# Patient Record
Sex: MALE | Race: WHITE | Employment: FULL TIME | URBAN - METROPOLITAN AREA
[De-identification: names, ages, dates, MRNs, and addresses within clinical notes are randomized per-mention and may not be internally consistent; named-entity substitution may affect disease eponyms.]

---

## 2018-09-06 ENCOUNTER — HOSPITAL ENCOUNTER (EMERGENCY)
Facility: CLINIC | Age: 28
Discharge: HOME | End: 2018-09-06
Attending: PSYCHIATRY & NEUROLOGY | Admitting: PSYCHIATRY & NEUROLOGY
Payer: COMMERCIAL

## 2018-09-06 VITALS
WEIGHT: 221.2 LBS | RESPIRATION RATE: 18 BRPM | SYSTOLIC BLOOD PRESSURE: 135 MMHG | DIASTOLIC BLOOD PRESSURE: 82 MMHG | TEMPERATURE: 97.3 F | OXYGEN SATURATION: 100 % | HEART RATE: 76 BPM

## 2018-09-06 DIAGNOSIS — F43.23 ADJUSTMENT DISORDER WITH MIXED ANXIETY AND DEPRESSED MOOD: ICD-10-CM

## 2018-09-06 DIAGNOSIS — Z86.59 HISTORY OF POST TRAUMATIC STRESS DISORDER: ICD-10-CM

## 2018-09-06 PROCEDURE — 90791 PSYCH DIAGNOSTIC EVALUATION: CPT

## 2018-09-06 PROCEDURE — 99284 EMERGENCY DEPT VISIT MOD MDM: CPT | Mod: Z6 | Performed by: PSYCHIATRY & NEUROLOGY

## 2018-09-06 PROCEDURE — 99285 EMERGENCY DEPT VISIT HI MDM: CPT | Mod: 25 | Performed by: PSYCHIATRY & NEUROLOGY

## 2018-09-06 RX ORDER — ESCITALOPRAM OXALATE 10 MG/1
10 TABLET ORAL DAILY
Qty: 30 TABLET | Refills: 0 | Status: SHIPPED | OUTPATIENT
Start: 2018-09-06

## 2018-09-06 RX ORDER — HYDROXYZINE HYDROCHLORIDE 25 MG/1
25 TABLET, FILM COATED ORAL EVERY 8 HOURS PRN
Qty: 30 TABLET | Refills: 0 | Status: SHIPPED | OUTPATIENT
Start: 2018-09-06 | End: 2020-10-28

## 2018-09-06 ASSESSMENT — ENCOUNTER SYMPTOMS
HYPERACTIVE: 0
GASTROINTESTINAL NEGATIVE: 1
NEUROLOGICAL NEGATIVE: 1
EYES NEGATIVE: 1
HALLUCINATIONS: 0
DECREASED CONCENTRATION: 1
HEMATOLOGIC/LYMPHATIC NEGATIVE: 1
ENDOCRINE NEGATIVE: 1
CARDIOVASCULAR NEGATIVE: 1
MUSCULOSKELETAL NEGATIVE: 1
RESPIRATORY NEGATIVE: 1
ACTIVITY CHANGE: 1
NERVOUS/ANXIOUS: 1

## 2018-09-06 NOTE — DISCHARGE INSTRUCTIONS
Continue with therapy. DBT is encouraged  Start escitalopram to manage your mood concerns  Start as needed hydroxyzine for anxiety and agitation until escitalopram takes effect  Follow-up with your primary care provider for med refills and continued management

## 2018-09-06 NOTE — ED AVS SNAPSHOT
[facility], Emergency Department    [address]    [place] [address]    Phone:  [phone]    Fax:  [phone]                                       Arturo Raman   MRN: [id number]    Department:  [facility], Emergency Department   Date of Visit:  9/6/2018           Patient Information     Date Of Birth          1990        Your diagnoses for this visit were:     Adjustment disorder with mixed anxiety and depressed mood     History of post traumatic stress disorder        You were seen by Cade Donahue MD.      Follow-up Information     Follow up with No Ref-Primary, Physician.        Discharge Instructions       Continue with therapy. DBT is encouraged  Start escitalopram to manage your mood concerns  Start as needed hydroxyzine for anxiety and agitation until escitalopram takes effect  Follow-up with your primary care provider for med refills and continued management    24 Hour Appointment Hotline       To make an appointment at any [facility] clinic, call [phone] ([phone]). If you don't have a family doctor or clinic, we will help you find one. [facility] clinics are conveniently located to serve the needs of you and your family.             Review of your medicines      START taking        Dose / Directions Last dose taken    escitalopram 10 MG tablet   Commonly known as:  LEXAPRO   Dose:  10 mg   Quantity:  30 tablet        Take 1 tablet (10 mg) by mouth daily   Refills:  0        hydrOXYzine 25 MG tablet   Commonly known as:  ATARAX   Dose:  25 mg   Quantity:  30 tablet        Take 1 tablet (25 mg) by mouth every 8 hours as needed for anxiety   Refills:  0                Prescriptions were sent or printed at these locations (2 Prescriptions)                   Other Prescriptions                Printed at Department/Unit printer (2 of 2)         escitalopram (LEXAPRO) 10 MG tablet               hydrOXYzine (ATARAX) 25 MG tablet                Orders Needing Specimen  "Collection     None      Pending Results     No orders found from 2018 to 2018.            Pending Culture Results     No orders found from 2018 to 2018.            Pending Results Instructions     If you had any lab results that were not finalized at the time of your Discharge, you can call the ED Lab Result RN at [phone]. You will be contacted by this team for any positive Lab results or changes in treatment. The nurses are available 7 days a week from 10A to 6:30P.  You can leave a message 24 hours per day and they will return your call.        Thank you for choosing [facility]       Thank you for choosing [facility] for your care. Our goal is always to provide you with excellent care. Hearing back from our patients is one way we can continue to improve our services. Please take a few minutes to complete the written survey that you may receive in the mail after you visit with us. Thank you!        [employer] Information     [employer] lets you send messages to your doctor, view your test results, renew your prescriptions, schedule appointments and more. To sign up, go to [email or web address] . Click on \"Log in\" on the left side of the screen, which will take you to the Welcome page. Then click on \"Sign up Now\" on the right side of the page.     You will be asked to enter the access code listed below, as well as some personal information. Please follow the directions to create your username and password.     Your access code is: [email or web address]  Expires: 2018  4:20 PM     Your access code will  in 90 days. If you need help or a new code, please call your [facility] clinic or [phone].        Care EveryWhere ID     This is your Care EveryWhere ID. This could be used by other organizations to access your [facility] medical records  [id number]        Equal Access to Services     PHILIPP DONALD AH: Herb Avalos, leatha perez, claire olivia " jose regan ah. So [facility] [phone].    ATENCIÓN: Si habla español, tiene a vasquez disposición servicios gratuitos de asistencia lingüística. Llame al [phone].    We comply with applicable federal civil rights laws and [place] laws. We do not discriminate on the basis of race, color, national origin, age, disability, sex, sexual orientation, or gender identity.            After Visit Summary       This is your record. Keep this with you and show to your community pharmacist(s) and doctor(s) at your next visit.
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
## 2018-09-06 NOTE — ED AVS SNAPSHOT
[facility], Emergency Department    [address]    [facility] [address]    Phone:  [phone]    Fax:  [phone]                                       Arturo Raman   MRN: [id number]    Department:  [facility], Emergency Department   Date of Visit:  9/6/2018           After Visit Summary Signature Page     I have received my discharge instructions, and my questions have been answered. I have discussed any challenges I see with this plan with the nurse or doctor.    ..........................................................................................................................................  Patient/Patient Representative Signature      ..........................................................................................................................................  Patient Representative Print Name and Relationship to Patient    ..................................................               ................................................  Date                                            Time    ..........................................................................................................................................  Reviewed by Signature/Title    ...................................................              ..............................................  Date                                                            Time          22EPIC Rev 08/18

## 2020-07-01 ENCOUNTER — OFFICE VISIT (OUTPATIENT)
Dept: DERMATOLOGY | Facility: CLINIC | Age: 30
End: 2020-07-01
Payer: COMMERCIAL

## 2020-07-01 VITALS — DIASTOLIC BLOOD PRESSURE: 83 MMHG | HEART RATE: 64 BPM | SYSTOLIC BLOOD PRESSURE: 129 MMHG

## 2020-07-01 DIAGNOSIS — L63.9 ALOPECIA AREATA: Primary | ICD-10-CM

## 2020-07-01 PROCEDURE — 11900 INJECT SKIN LESIONS </W 7: CPT | Performed by: PHYSICIAN ASSISTANT

## 2020-07-01 PROCEDURE — 99203 OFFICE O/P NEW LOW 30 MIN: CPT | Mod: 25 | Performed by: PHYSICIAN ASSISTANT

## 2020-07-01 RX ORDER — CLOBETASOL PROPIONATE 0.5 MG/ML
SOLUTION TOPICAL 2 TIMES DAILY
Qty: 50 ML | Refills: 1 | Status: SHIPPED | OUTPATIENT
Start: 2020-07-01 | End: 2021-02-26

## 2020-07-01 RX ORDER — LORAZEPAM 0.5 MG/1
0.5 TABLET ORAL
COMMUNITY
End: 2023-02-09

## 2020-07-01 RX ORDER — DESONIDE 0.5 MG/G
CREAM TOPICAL PRN
COMMUNITY
End: 2021-06-16

## 2020-07-01 RX ORDER — DEXTROAMPHETAMINE SACCHARATE, AMPHETAMINE ASPARTATE, DEXTROAMPHETAMINE SULFATE AND AMPHETAMINE SULFATE 5; 5; 5; 5 MG/1; MG/1; MG/1; MG/1
TABLET ORAL
COMMUNITY
Start: 2020-03-12

## 2020-07-01 NOTE — PATIENT INSTRUCTIONS
Today we injected Kenalog. Kenalog is an antiinflammatory medication. We can do injections every four weeks as needed. Atrophy (thinning of the skin) and pigment changes can occur.    Proper skin care from [facility]:    -Eliminate harsh soaps as they strip the natural oils from the skin, often resulting in dry itchy skin ( i.e. Dial, Zest, Bulgarian Spring)  -Use mild soaps such as Cetaphil or Dove Sensitive Skin in the shower. You do not need to use soap on arms, legs, and trunk every time you shower unless visibly soiled.   -Avoid hot or cold showers.  -After showering, lightly dry off and apply moisturizing within 2-3 minutes. This will help trap moisture in the skin.   -Aggressive use of a moisturizer at least 1-2 times a day to the entire body (including -Vanicream, Cetaphil, Aquaphor or Cerave) and moisturize hands after every washing.  -We recommend using moisturizers that come in a tub that needs to be scooped out, not a pump. This has more of an oil base. It will hold moisture in your skin much better than a water base moisturizer. The above recommended are non-pore clogging.      Wear a sunscreen with at least SPF 30 on your face, ears, neck and V of the chest daily. Wear sunscreen on other areas of the body if those areas are exposed to the sun throughout the day. Sunscreens can contain physical and/or chemical blockers. Physical blockers are less likely to clog pores, these include zinc oxide and titanium dioxide. Reapply every two hour and after swimming. Sunscreen examples include Neutrogena, CeraVe, Blue Lizard, Elta MD and many others.    UV radiation  UVA radiation remains constant throughout the day and throughout the year. It is a longer wavelength than UVB and therefore penetrates deeper into the skin leading to immediate and delayed tanning, photoaging, and skin cancer. 70-80% of UVA and UVB radiation occurs between the hours of 10am-2pm.  UVB radiation  UVB radiation causes the most  harmful effects and is more significant during the summer months. However, snow and ice can reflect UVB radiation leading to skin damage during the winter months as well. UVB radiation is responsible for tanning, burning, inflammation, delayed erythema (pinkness), pigmentation (brown spots), and skin cancer.       Apply a thin layer of clobetasol to affected area 2x a day for 4 weeks to scalp. Tapering with improvement. Do not use on face or body folds.  Apply a thin layer of desonide to affected area on beard 2x a day for 4 weeks to beard area.   Discussed side effects of topical steroids including but not limited to atrophy (skin thinning), striae (stretch marks) telangiectasias, steroid acne, and others. Do not apply to normal skin. Do not apply to discolored skin that does not have rash present. Educated patient on post inflammatory hyperpigmentation.
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
## 2020-07-16 ENCOUNTER — TRANSFERRED RECORDS (OUTPATIENT)
Dept: HEALTH INFORMATION MANAGEMENT | Facility: CLINIC | Age: 30
End: 2020-07-16
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
## 2020-09-24 ENCOUNTER — OFFICE VISIT (OUTPATIENT)
Dept: NEUROSURGERY | Facility: CLINIC | Age: 30
End: 2020-09-24
Attending: PSYCHIATRY & NEUROLOGY
Payer: COMMERCIAL

## 2020-09-24 VITALS
DIASTOLIC BLOOD PRESSURE: 73 MMHG | OXYGEN SATURATION: 97 % | WEIGHT: 225 LBS | HEART RATE: 78 BPM | TEMPERATURE: 98.4 F | SYSTOLIC BLOOD PRESSURE: 118 MMHG | BODY MASS INDEX: 28.88 KG/M2 | HEIGHT: 74 IN

## 2020-09-24 DIAGNOSIS — G44.229 CHRONIC TENSION-TYPE HEADACHE, NOT INTRACTABLE: ICD-10-CM

## 2020-09-24 DIAGNOSIS — S39.012A LUMBAR STRAIN, INITIAL ENCOUNTER: ICD-10-CM

## 2020-09-24 DIAGNOSIS — S16.1XXA CERVICAL STRAIN, INITIAL ENCOUNTER: Primary | ICD-10-CM

## 2020-09-24 PROBLEM — G44.209 TENSION TYPE HEADACHE: Status: ACTIVE | Noted: 2020-09-24

## 2020-09-24 PROCEDURE — G0463 HOSPITAL OUTPT CLINIC VISIT: HCPCS

## 2020-09-24 PROCEDURE — 99204 OFFICE O/P NEW MOD 45 MIN: CPT | Performed by: PSYCHIATRY & NEUROLOGY

## 2020-09-24 ASSESSMENT — PAIN SCALES - GENERAL: PAINLEVEL: MILD PAIN (3)

## 2020-09-24 ASSESSMENT — MIFFLIN-ST. JEOR: SCORE: 2050.34
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
## 2020-10-28 ENCOUNTER — OFFICE VISIT (OUTPATIENT)
Dept: DERMATOLOGY | Facility: CLINIC | Age: 30
End: 2020-10-28
Payer: COMMERCIAL

## 2020-10-28 VITALS — SYSTOLIC BLOOD PRESSURE: 125 MMHG | DIASTOLIC BLOOD PRESSURE: 75 MMHG

## 2020-10-28 DIAGNOSIS — L63.9 ALOPECIA AREATA: Primary | ICD-10-CM

## 2020-10-28 DIAGNOSIS — L64.9 ANDROGENETIC ALOPECIA: ICD-10-CM

## 2020-10-28 PROCEDURE — 99213 OFFICE O/P EST LOW 20 MIN: CPT | Mod: 25 | Performed by: PHYSICIAN ASSISTANT

## 2020-10-28 PROCEDURE — 11900 INJECT SKIN LESIONS </W 7: CPT | Performed by: PHYSICIAN ASSISTANT

## 2020-10-28 RX ORDER — PROPRANOLOL HYDROCHLORIDE 10 MG/1
TABLET ORAL
COMMUNITY
Start: 2020-10-22 | End: 2023-02-09

## 2020-10-28 NOTE — PROGRESS NOTES
HPI:  Arturo Raman is a 29 year old male patient here today for alopecia areata of scalp and beard .  Patient states this has been present for months.  Patient reports the following symptoms: improvement with desonide probably once a day to beard and and clobetasol bid 5 days a week to scalp. LOV IL TAC with improvement.  Patient reports the following previous treatments: topicals and IL TAC.  Patient reports the following modifying factors: none.  Associated symptoms: none.  Patient has no other skin complaints today.  Remainder of the HPI, Meds, PMH, Allergies, FH, and SH was reviewed in chart.      History reviewed. No pertinent past medical history.    History reviewed. No pertinent surgical history.     Family History   Problem Relation Age of Onset     Hypertension Father        Social History     Socioeconomic History     Marital status: Single     Spouse name: Not on file     Number of children: Not on file     Years of education: Not on file     Highest education level: Not on file   Occupational History     Not on file   Social Needs     Financial resource strain: Not on file     Food insecurity     Worry: Not on file     Inability: Not on file     Transportation needs     Medical: Not on file     Non-medical: Not on file   Tobacco Use     Smoking status: Former Smoker     Quit date:      Years since quittin.8     Smokeless tobacco: Never Used   Substance and Sexual Activity     Alcohol use: No     Drug use: No     Sexual activity: Not on file   Lifestyle     Physical activity     Days per week: Not on file     Minutes per session: Not on file     Stress: Not on file   Relationships     Social connections     Talks on phone: Not on file     Gets together: Not on file     Attends Restorationism service: Not on file     Active member of club or organization: Not on file     Attends meetings of clubs or organizations: Not on file     Relationship status: Not on file     Intimate partner violence      Fear of current or ex partner: Not on file     Emotionally abused: Not on file     Physically abused: Not on file     Forced sexual activity: Not on file   Other Topics Concern     Parent/sibling w/ CABG, MI or angioplasty before 65F 55M? Not Asked   Social History Narrative     Not on file       Outpatient Encounter Medications as of 10/28/2020   Medication Sig Dispense Refill     amphetamine-dextroamphetamine (ADDERALL) 20 MG tablet TAKE HALF TABLET (10 MG) TWICE DAILY.       clobetasol (TEMOVATE) 0.05 % external solution Apply topically 2 times daily To aa on scalp x 1 month. Do not use on face or body folds. 50 mL 1     desonide (DESOWEN) 0.05 % external cream Apply topically as needed       escitalopram (LEXAPRO) 10 MG tablet Take 1 tablet (10 mg) by mouth daily 30 tablet 0     LORazepam (ATIVAN) 0.5 MG tablet Take 0.5 mg by mouth       propranolol (INDERAL) 10 MG tablet TK 1 T PO BID       [DISCONTINUED] hydrOXYzine (ATARAX) 25 MG tablet Take 1 tablet (25 mg) by mouth every 8 hours as needed for anxiety 30 tablet 0     No facility-administered encounter medications on file as of 10/28/2020.        Review Of Systems:  Skin: loss of hair  Eyes: negative  Ears/Nose/Throat: negative  Respiratory: No shortness of breath, dyspnea on exertion, cough, or hemoptysis  Cardiovascular: negative  Gastrointestinal: negative  Genitourinary: negative  Musculoskeletal: negative  Neurologic: negative  Psychiatric: negative  Hematologic/Lymphatic/Immunologic: negative  Endocrine: negative      Objective:     /75   Eyes: Conjunctivae/lids: Normal   ENT: Lips:  Normal  MSK: Normal  Cardiovascular: Peripheral edema none  Pulm: Breathing Normal  Neuro/Psych: Orientation: A/O x 3 Normal; Mood/Affect: Normal, NAD, WDWN  Pt accompanied by: self  Following areas examined: face, neck, scalp  Cabello skin type:ii   Findings:  Annular patch of non scarring hair loss with depigmented terminal hairs on left parietal scalp,  submental, left cheek( beard area). Thinning of hair at right frontotemporal region  Assessment and Plan:  1) alopecia areata +/- male pattern  Disc etiology and course  Treatment options include topical steroid, IL Tac, laser, and squaric acid  Apply a thin layer of clobetasol to affected area 2x a day for 4 weeks to scalp. Tapering with improvement. Do not use on face or body folds.  Apply a thin layer of desonide to affected area on beard 2x a day for 4 weeks to beard area.   Discussed side effects of topical steroids including but not limited to atrophy (skin thinning), striae (stretch marks) telangiectasias, steroid acne, and others. Do not apply to normal skin. Do not apply to discolored skin that does not have rash present. Educated patient on post inflammatory hyperpigmentation  IL TAC: PGACAC discussed.  Risks including but not limited to injection site reaction, bruising, no resolution.  All questions answered and entertained to patient s satisfaction.  Informed consent obtained.  IL TAC in concentration of 5mg/ml was injected ID to two patches on hairloss.  Total injected was  0.5 ml.  Patient tolerated without complications and given wound care instructions, including not to move product around.  Return in 4 weeks for follow-up and possible additional IL TAC. May need additional treatment. May not be effective.    Lot #:[id number]  Exp: feb 2022  Sodium chloride  Lot #:[id number]  Exp: [id number]    Follow up in 1-2 months

## 2020-10-28 NOTE — PATIENT INSTRUCTIONS
Proper skin care from [facility]:    -Eliminate harsh soaps as they strip the natural oils from the skin, often resulting in dry itchy skin ( i.e. Dial, Zest, [place])  -Use mild soaps such as Cetaphil or Dove Sensitive Skin in the shower. You do not need to use soap on arms, legs, and trunk every time you shower unless visibly soiled.   -Avoid hot or cold showers.  -After showering, lightly dry off and apply moisturizing within 2-3 minutes. This will help trap moisture in the skin.   -Aggressive use of a moisturizer at least 1-2 times a day to the entire body (including -Vanicream, Cetaphil, Aquaphor or Cerave) and moisturize hands after every washing.  -We recommend using moisturizers that come in a tub that needs to be scooped out, not a pump. This has more of an oil base. It will hold moisture in your skin much better than a water base moisturizer. The above recommended are non-pore clogging.      Wear a sunscreen with at least SPF 30 on your face, ears, neck and V of the chest daily. Wear sunscreen on other areas of the body if those areas are exposed to the sun throughout the day. Sunscreens can contain physical and/or chemical blockers. Physical blockers are less likely to clog pores, these include zinc oxide and titanium dioxide. Reapply every two hour and after swimming. Sunscreen examples include Neutrogena, CeraVe, Blue Lizard, Elta MD and many others.    UV radiation  UVA radiation remains constant throughout the day and throughout the year. It is a longer wavelength than UVB and therefore penetrates deeper into the skin leading to immediate and delayed tanning, photoaging, and skin cancer. 70-80% of UVA and UVB radiation occurs between the hours of 10am-2pm.  UVB radiation  UVB radiation causes the most harmful effects and is more significant during the summer months. However, snow and ice can reflect UVB radiation leading to skin damage during the winter months as well. UVB radiation is  responsible for tanning, burning, inflammation, delayed erythema (pinkness), pigmentation (brown spots), and skin cancer.     I recommend self monthly full body exams and yearly full body exams with a dermatology provider. If you develop a new or changing lesion please follow up for examination. Most skin cancers are pink and scaly or pink and pearly. However, we do see blue/brown/black skin cancers.  Consider the ABCDEs of melanoma when giving yourself your monthly full body exam ( don't forget the groin, buttocks, feet, toes, etc). A-asymmetry, B-borders, C-color, D-diameter, E-elevation or evolving. If you see any of these changes please follow up in clinic. If you cannot see your back I recommend purchasing a hand held mirror to use with a larger wall mirror.

## 2020-10-28 NOTE — LETTER
10/28/2020         RE: Arturo Raman  [address]  [facility] [address]        Dear Colleague,    Thank you for referring your patient, Arturo Raman, to the [facility]. Please see a copy of my visit note below.    HPI:  Arturo Raman is a 29 year old male patient here today for alopecia areata of scalp and beard .  Patient states this has been present for months.  Patient reports the following symptoms: improvement with desonide probably once a day to beard and and clobetasol bid 5 days a week to scalp. LOV IL TAC with improvement.  Patient reports the following previous treatments: topicals and IL TAC.  Patient reports the following modifying factors: none.  Associated symptoms: none.  Patient has no other skin complaints today.  Remainder of the HPI, Meds, PMH, Allergies, FH, and SH was reviewed in chart.      History reviewed. No pertinent past medical history.    History reviewed. No pertinent surgical history.     Family History   Problem Relation Age of Onset     Hypertension Father        Social History     Socioeconomic History     Marital status: Single     Spouse name: Not on file     Number of children: Not on file     Years of education: Not on file     Highest education level: Not on file   Occupational History     Not on file   Social Needs     Financial resource strain: Not on file     Food insecurity     Worry: Not on file     Inability: Not on file     Transportation needs     Medical: Not on file     Non-medical: Not on file   Tobacco Use     Smoking status: Former Smoker     Quit date:      Years since quittin.8     Smokeless tobacco: Never Used   Substance and Sexual Activity     Alcohol use: No     Drug use: No     Sexual activity: Not on file   Lifestyle     Physical activity     Days per week: Not on file     Minutes per session: Not on file     Stress: Not on file   Relationships     Social connections     Talks on phone: Not  on file     Gets together: Not on file     Attends Hinduism service: Not on file     Active member of club or organization: Not on file     Attends meetings of clubs or organizations: Not on file     Relationship status: Not on file     Intimate partner violence     Fear of current or ex partner: Not on file     Emotionally abused: Not on file     Physically abused: Not on file     Forced sexual activity: Not on file   Other Topics Concern     Parent/sibling w/ CABG, MI or angioplasty before 65F 55M? Not Asked   Social History Narrative     Not on file       Outpatient Encounter Medications as of 10/28/2020   Medication Sig Dispense Refill     amphetamine-dextroamphetamine (ADDERALL) 20 MG tablet TAKE HALF TABLET (10 MG) TWICE DAILY.       clobetasol (TEMOVATE) 0.05 % external solution Apply topically 2 times daily To aa on scalp x 1 month. Do not use on face or body folds. 50 mL 1     desonide (DESOWEN) 0.05 % external cream Apply topically as needed       escitalopram (LEXAPRO) 10 MG tablet Take 1 tablet (10 mg) by mouth daily 30 tablet 0     LORazepam (ATIVAN) 0.5 MG tablet Take 0.5 mg by mouth       propranolol (INDERAL) 10 MG tablet TK 1 T PO BID       [DISCONTINUED] hydrOXYzine (ATARAX) 25 MG tablet Take 1 tablet (25 mg) by mouth every 8 hours as needed for anxiety 30 tablet 0     No facility-administered encounter medications on file as of 10/28/2020.        Review Of Systems:  Skin: loss of hair  Eyes: negative  Ears/Nose/Throat: negative  Respiratory: No shortness of breath, dyspnea on exertion, cough, or hemoptysis  Cardiovascular: negative  Gastrointestinal: negative  Genitourinary: negative  Musculoskeletal: negative  Neurologic: negative  Psychiatric: negative  Hematologic/Lymphatic/Immunologic: negative  Endocrine: negative      Objective:     /75   Eyes: Conjunctivae/lids: Normal   ENT: Lips:  Normal  MSK: Normal  Cardiovascular: Peripheral edema none  Pulm: Breathing Normal  Neuro/Psych:  Orientation: A/O x 3 Normal; Mood/Affect: Normal, NAD, WDWN  Pt accompanied by: self  Following areas examined: face, neck, scalp  Cabello skin type:ii   Findings:  Annular patch of non scarring hair loss with depigmented terminal hairs on left parietal scalp, submental, left cheek( beard area). Thinning of hair at right frontotemporal region  Assessment and Plan:  1) alopecia areata +/- male pattern  Disc etiology and course  Treatment options include topical steroid, IL Tac, laser, and squaric acid  Apply a thin layer of clobetasol to affected area 2x a day for 4 weeks to scalp. Tapering with improvement. Do not use on face or body folds.  Apply a thin layer of desonide to affected area on beard 2x a day for 4 weeks to beard area.   Discussed side effects of topical steroids including but not limited to atrophy (skin thinning), striae (stretch marks) telangiectasias, steroid acne, and others. Do not apply to normal skin. Do not apply to discolored skin that does not have rash present. Educated patient on post inflammatory hyperpigmentation  IL TAC: PGACAC discussed.  Risks including but not limited to injection site reaction, bruising, no resolution.  All questions answered and entertained to patient s satisfaction.  Informed consent obtained.  IL TAC in concentration of 5mg/ml was injected ID to two patches on hairloss.  Total injected was  0.5 ml.  Patient tolerated without complications and given wound care instructions, including not to move product around.  Return in 4 weeks for follow-up and possible additional IL TAC. May need additional treatment. May not be effective.    Lot #:[id number]  Exp: feb 2022  Sodium chloride  Lot #:[id number]  Exp: [id number]    Follow up in 1-2 months         Again, thank you for allowing me to participate in the care of your patient.        Sincerely,        Krystle Vazquez PA-C

## 2021-02-25 DIAGNOSIS — L63.9 ALOPECIA AREATA: ICD-10-CM

## 2021-02-26 RX ORDER — CLOBETASOL PROPIONATE 0.5 MG/ML
SOLUTION TOPICAL 2 TIMES DAILY
Qty: 50 ML | Refills: 0 | Status: SHIPPED | OUTPATIENT
Start: 2021-02-26 | End: 2021-06-16

## 2021-05-14 DIAGNOSIS — L63.9 ALOPECIA AREATA: ICD-10-CM

## 2021-05-14 NOTE — LETTER
[facility] [address] [address]  Phone: [phone]  Fax: [phone]    May 17, 2021      Arturo Raman                                                                                                               [address] [address]            Dear Mr. Westfalllyubov,    Many medications require routine follow-up with your Doctor.      At this time we ask that: You schedule a routine office visit with Krystle Vazquez PA-C in Dermatology.    Your prescription for clobetasol: Cannot be refilled until the above noted follow up has been completed.      Thank you,      [facility]

## 2021-05-17 RX ORDER — CLOBETASOL PROPIONATE 0.5 MG/ML
SOLUTION TOPICAL 2 TIMES DAILY
Qty: 50 ML | Refills: 0 | OUTPATIENT
Start: 2021-05-17

## 2021-05-17 NOTE — TELEPHONE ENCOUNTER
Snail mail letter sent:    Dear Mr. Raman,    Many medications require routine follow-up with your Doctor.      At this time we ask that: You schedule a routine office visit with Krystle Vazquez PA-C in Dermatology.    Your prescription for clobetasol: Cannot be refilled until the above noted follow up has been completed.      Thank you,      [facility]

## 2021-05-19 ENCOUNTER — TELEPHONE (OUTPATIENT)
Dept: FAMILY MEDICINE | Facility: CLINIC | Age: 31
End: 2021-05-19

## 2021-05-19 NOTE — TELEPHONE ENCOUNTER
Snail mail letter sent:    Dear  Lamine,    Many medications require routine follow-up with your Doctor.      At this time we ask that: You schedule a routine office visit with Krystle Vazquez PA-C in Dermatology.    Your prescription: Cannot be refilled until the above noted follow up has been completed.      Thank you,      [facility]

## 2021-05-19 NOTE — LETTER
[facility]  [address] [place] [address]  Phone: [phone]  Fax: [phone]    May 19, 2021      Arturo Raman                                                                                                           [address] [address]            Dear  Lamine,    Many medications require routine follow-up with your Doctor.      At this time we ask that: You schedule a routine office visit with Krystle Vazquez PA-C in Dermatology.    Your prescription: Cannot be refilled until the above noted follow up has been completed.      Thank you,      [facility]

## 2021-05-19 NOTE — TELEPHONE ENCOUNTER
Reason for Call:  Other call back    Detailed comments: needs pharmacy called for prescription renewaL. [facility]    Phone Number Patient can be reached at: Cell number on file:    Telephone Information:   Mobile [phone]       Best Time: ANY    Can we leave a detailed message on this number? YES    Call taken on 5/19/2021 at 10:32 AM by Marla Leslie

## 2021-06-16 ENCOUNTER — OFFICE VISIT (OUTPATIENT)
Dept: DERMATOLOGY | Facility: CLINIC | Age: 31
End: 2021-06-16
Payer: COMMERCIAL

## 2021-06-16 VITALS — HEART RATE: 60 BPM | DIASTOLIC BLOOD PRESSURE: 85 MMHG | SYSTOLIC BLOOD PRESSURE: 131 MMHG

## 2021-06-16 DIAGNOSIS — L64.9 ANDROGENETIC ALOPECIA: Primary | ICD-10-CM

## 2021-06-16 DIAGNOSIS — L63.9 ALOPECIA AREATA: ICD-10-CM

## 2021-06-16 PROCEDURE — 99213 OFFICE O/P EST LOW 20 MIN: CPT | Mod: 25 | Performed by: PHYSICIAN ASSISTANT

## 2021-06-16 PROCEDURE — 11900 INJECT SKIN LESIONS </W 7: CPT | Performed by: PHYSICIAN ASSISTANT

## 2021-06-16 RX ORDER — DESONIDE 0.5 MG/G
CREAM TOPICAL
Qty: 60 G | Refills: 1 | Status: SHIPPED | OUTPATIENT
Start: 2021-06-16 | End: 2021-06-21

## 2021-06-16 RX ORDER — CLOBETASOL PROPIONATE 0.5 MG/ML
SOLUTION TOPICAL 2 TIMES DAILY
Qty: 50 ML | Refills: 2 | Status: SHIPPED | OUTPATIENT
Start: 2021-06-16 | End: 2023-02-09

## 2021-06-16 NOTE — PATIENT INSTRUCTIONS
Today we injected Kenalog. Kenalog is an antiinflammatory medication. We can do injections every four weeks as needed. Atrophy (thinning of the skin) and pigment changes can occur.      Proper skin care from [facility]:    -Eliminate harsh soaps as they strip the natural oils from the skin, often resulting in dry itchy skin ( i.e. Dial, Zest, Nigerian Spring)  -Use mild soaps such as Cetaphil or Dove Sensitive Skin in the shower. You do not need to use soap on arms, legs, and trunk every time you shower unless visibly soiled.   -Avoid hot or cold showers.  -After showering, lightly dry off and apply moisturizing within 2-3 minutes. This will help trap moisture in the skin.   -Aggressive use of a moisturizer at least 1-2 times a day to the entire body (including -Vanicream, Cetaphil, Aquaphor or Cerave) and moisturize hands after every washing.  -We recommend using moisturizers that come in a tub that needs to be scooped out, not a pump. This has more of an oil base. It will hold moisture in your skin much better than a water base moisturizer. The above recommended are non-pore clogging.      Wear a sunscreen with at least SPF 30 on your face, ears, neck and V of the chest daily. Wear sunscreen on other areas of the body if those areas are exposed to the sun throughout the day. Sunscreens can contain physical and/or chemical blockers. Physical blockers are less likely to clog pores, these include zinc oxide and titanium dioxide. Reapply every two hour and after swimming. Sunscreen examples include Neutrogena, CeraVe, Blue Lizard, Elta MD and many others.    UV radiation  UVA radiation remains constant throughout the day and throughout the year. It is a longer wavelength than UVB and therefore penetrates deeper into the skin leading to immediate and delayed tanning, photoaging, and skin cancer. 70-80% of UVA and UVB radiation occurs between the hours of 10am-2pm.  UVB radiation  UVB radiation causes the most  harmful effects and is more significant during the summer months. However, snow and ice can reflect UVB radiation leading to skin damage during the winter months as well. UVB radiation is responsible for tanning, burning, inflammation, delayed erythema (pinkness), pigmentation (brown spots), and skin cancer.     I recommend self monthly full body exams and yearly full body exams with a dermatology provider. If you develop a new or changing lesion please follow up for examination. Most skin cancers are pink and scaly or pink and pearly. However, we do see blue/brown/black skin cancers.  Consider the ABCDEs of melanoma when giving yourself your monthly full body exam ( don't forget the groin, buttocks, feet, toes, etc). A-asymmetry, B-borders, C-color, D-diameter, E-elevation or evolving. If you see any of these changes please follow up in clinic. If you cannot see your back I recommend purchasing a hand held mirror to use with a larger wall mirror.

## 2021-06-16 NOTE — PROGRESS NOTES
HPI:  Arturo Raman is a 29 year old male patient here today for alopecia areata of scalp and beard .  Patient states this has been present for months.  Patient reports the following symptoms: improvement with desonide probably once a day to beard and and clobetasol  to scalp. LOV IL TAC with improvement.  Patient reports the following previous treatments: topicals and IL TAC.  Patient reports the following modifying factors: none.  Associated symptoms: none.  Patient has no other skin complaints today.  Remainder of the HPI, Meds, PMH, Allergies, FH, and SH was reviewed in chart.      No past medical history on file.    No past surgical history on file.     Family History   Problem Relation Age of Onset     Hypertension Father        Social History     Socioeconomic History     Marital status: Single     Spouse name: Not on file     Number of children: Not on file     Years of education: Not on file     Highest education level: Not on file   Occupational History     Not on file   Social Needs     Financial resource strain: Not on file     Food insecurity     Worry: Not on file     Inability: Not on file     Transportation needs     Medical: Not on file     Non-medical: Not on file   Tobacco Use     Smoking status: Former Smoker     Quit date:      Years since quittin.4     Smokeless tobacco: Never Used   Substance and Sexual Activity     Alcohol use: No     Drug use: No     Sexual activity: Not on file   Lifestyle     Physical activity     Days per week: Not on file     Minutes per session: Not on file     Stress: Not on file   Relationships     Social connections     Talks on phone: Not on file     Gets together: Not on file     Attends Moravian service: Not on file     Active member of club or organization: Not on file     Attends meetings of clubs or organizations: Not on file     Relationship status: Not on file     Intimate partner violence     Fear of current or ex partner: Not on file      Emotionally abused: Not on file     Physically abused: Not on file     Forced sexual activity: Not on file   Other Topics Concern     Parent/sibling w/ CABG, MI or angioplasty before 65F 55M? Not Asked   Social History Narrative     Not on file       Outpatient Encounter Medications as of 6/16/2021   Medication Sig Dispense Refill     amphetamine-dextroamphetamine (ADDERALL) 20 MG tablet TAKE HALF TABLET (10 MG) TWICE DAILY.       clobetasol (TEMOVATE) 0.05 % external solution Apply topically 2 times daily To aa on scalp x 1 month. Do not use on face or body folds. 50 mL 0     desonide (DESOWEN) 0.05 % external cream Apply topically as needed       escitalopram (LEXAPRO) 10 MG tablet Take 1 tablet (10 mg) by mouth daily 30 tablet 0     LORazepam (ATIVAN) 0.5 MG tablet Take 0.5 mg by mouth       propranolol (INDERAL) 10 MG tablet TK 1 T PO BID       No facility-administered encounter medications on file as of 6/16/2021.        Review Of Systems:  Skin: loss of hair  Eyes: negative  Ears/Nose/Throat: negative  Respiratory: No shortness of breath, dyspnea on exertion, cough, or hemoptysis  Cardiovascular: negative  Gastrointestinal: negative  Genitourinary: negative  Musculoskeletal: negative  Neurologic: negative  Psychiatric: negative  Hematologic/Lymphatic/Immunologic: negative  Endocrine: negative      Objective:     There were no vitals taken for this visit.  Eyes: Conjunctivae/lids: Normal   ENT: Lips:  Normal  MSK: Normal  Cardiovascular: Peripheral edema none  Pulm: Breathing Normal  Neuro/Psych: Orientation: A/O x 3 Normal; Mood/Affect: Normal, NAD, WDWN  Pt accompanied by: self  Following areas examined: face, neck, scalp  Cabelol skin type:ii   Findings:  Annular patch of non scarring hair loss with depigmented terminal hairs on left parietal scalp, submental, jaw line. Thinning of hair at right frontotemporal region  Assessment and Plan:  1) alopecia areata and androgenetic alopecia  Disc etiology and  course  Treatment options include topical steroid and IL Tac  Apply a thin layer of clobetasol to affected area 2x a day for 4 weeks to scalp. Tapering with improvement. Do not use on face or body folds.  Apply a thin layer of desonide to affected area on beard 2x a day for 4 weeks to beard area.   Discussed side effects of topical steroids including but not limited to atrophy (skin thinning), striae (stretch marks) telangiectasias, steroid acne, and others. Do not apply to normal skin. Do not apply to discolored skin that does not have rash present. Educated patient on post inflammatory hyperpigmentation  IL TAC: PGACAC discussed.  Risks including but not limited to injection site reaction, bruising, no resolution.  All questions answered and entertained to patient s satisfaction.  Informed consent obtained.  IL TAC in concentration of 5mg/ml was injected ID to one patch on left parietal scalp and 2.5mg/ml into 5 patches on beard area.  Total injected was  0.5 ml.  Patient tolerated without complications and given wound care instructions, including not to move product around.  Return in 4 weeks for follow-up and possible additional IL TAC. May need additional treatment. May not be effective.    IL TACLot abu [id number] Aug 2022  Sodium chloride lot [id number]    Male pattern alopecia or androgenetic alopecia is mediated by dihydrotestosterone causing miniaturization of the hair follicles. Hair loss if first seen on the anterior scalp and bitemporal regions. The only clinically proven methods to prevent hair loss are Finasteride and Minoxidil. Neither Finasteride nor Minoxidil can cause hair to regrow. Once treatment is discontinued hair loss will progress.    Side effects of oral Finasteride include and are not limited to impotence, decreased libido, abnormal ejaculation, sexual dysfunction and gynecomastia.    Side effects of Minoxidil (Rogaine) include contact dermatitis, pruritis (itch), and skin  irritation.        Follow up in for il tac. Topical sent

## 2021-06-17 ENCOUNTER — TELEPHONE (OUTPATIENT)
Dept: FAMILY MEDICINE | Facility: CLINIC | Age: 31
End: 2021-06-17

## 2021-06-17 NOTE — TELEPHONE ENCOUNTER
Prior Authorization Retail Medication Request    Medication/Dose: Desonide  ICD code (if different than what is on RX):    Previously Tried and Failed:    Rationale:      Insurance Name:  [facility]  Insurance ID:  [id number]      Pharmacy Information (if different than what is on RX)  Name:  [facility]  Phone:  [phone]    Yannick Rendon, PharmD  [facility]  [phone]

## 2021-06-18 NOTE — TELEPHONE ENCOUNTER
Central Prior Authorization Team   Phone: [phone]    PA Initiation    Medication: Desonide  Insurance Company:    Pharmacy Filling the Rx: [place], [place] - [address] [place] [address]  Filling Pharmacy Phone: [phone]  Filling Pharmacy Fax: [phone]  Start Date: 6/18/2021

## 2021-06-21 DIAGNOSIS — L63.9 ALOPECIA AREATA: Primary | ICD-10-CM

## 2021-06-21 RX ORDER — TRIAMCINOLONE ACETONIDE 0.25 MG/G
CREAM TOPICAL 2 TIMES DAILY
Qty: 30 G | Refills: 1 | Status: SHIPPED | OUTPATIENT
Start: 2021-06-21 | End: 2023-02-09

## 2021-06-21 NOTE — TELEPHONE ENCOUNTER
PRIOR AUTHORIZATION DENIED    Medication: Desonide-DENIED    Denial Date: 6/21/2021    Denial Rational: PATIENT MUST TRY/FAIL ALL FORMULARY ALTERNATIVES - BETAMETHASONE LOTION, FLUOCINOLONE CREAM, TRIAMCINOLONE LOTION, CREAM.        Appeal Information:  IF PATIENT IS UNABLE TO TRY/FAIL ALTERNATIVE(S) PLEASE SUPPLY PA TEAM WITH A LETTER OF MEDICAL NECESSITY WITH CLINICAL REASON.

## 2023-02-03 ASSESSMENT — ENCOUNTER SYMPTOMS
DYSURIA: 0
WEAKNESS: 0
FREQUENCY: 0
MYALGIAS: 1
NAUSEA: 0
JOINT SWELLING: 0
PARESTHESIAS: 0
ABDOMINAL PAIN: 1
CONSTIPATION: 0
FEVER: 0
HEARTBURN: 0
HEMATURIA: 0
COUGH: 0
HEADACHES: 0
DIZZINESS: 0
CHILLS: 0
SORE THROAT: 0
EYE PAIN: 0
PALPITATIONS: 0
DIARRHEA: 0
SHORTNESS OF BREATH: 0
NERVOUS/ANXIOUS: 0
ARTHRALGIAS: 0
HEMATOCHEZIA: 0

## 2023-02-09 ENCOUNTER — OFFICE VISIT (OUTPATIENT)
Dept: FAMILY MEDICINE | Facility: CLINIC | Age: 33
End: 2023-02-09
Payer: COMMERCIAL

## 2023-02-09 VITALS
OXYGEN SATURATION: 97 % | HEIGHT: 74 IN | SYSTOLIC BLOOD PRESSURE: 133 MMHG | DIASTOLIC BLOOD PRESSURE: 79 MMHG | RESPIRATION RATE: 14 BRPM | BODY MASS INDEX: 30.34 KG/M2 | TEMPERATURE: 97.3 F | HEART RATE: 71 BPM | WEIGHT: 236.4 LBS

## 2023-02-09 DIAGNOSIS — Z00.00 ROUTINE GENERAL MEDICAL EXAMINATION AT A HEALTH CARE FACILITY: Primary | ICD-10-CM

## 2023-02-09 DIAGNOSIS — Z13.1 SCREENING FOR DIABETES MELLITUS: ICD-10-CM

## 2023-02-09 DIAGNOSIS — K62.89 RECTAL PAIN: ICD-10-CM

## 2023-02-09 DIAGNOSIS — R03.0 ELEVATED BP WITHOUT DIAGNOSIS OF HYPERTENSION: ICD-10-CM

## 2023-02-09 DIAGNOSIS — F90.0 ATTENTION DEFICIT HYPERACTIVITY DISORDER (ADHD), PREDOMINANTLY INATTENTIVE TYPE: ICD-10-CM

## 2023-02-09 DIAGNOSIS — Z11.3 SCREEN FOR STD (SEXUALLY TRANSMITTED DISEASE): ICD-10-CM

## 2023-02-09 DIAGNOSIS — E66.811 CLASS 1 OBESITY DUE TO EXCESS CALORIES WITHOUT SERIOUS COMORBIDITY WITH BODY MASS INDEX (BMI) OF 30.0 TO 30.9 IN ADULT: ICD-10-CM

## 2023-02-09 DIAGNOSIS — F41.9 ANXIETY: ICD-10-CM

## 2023-02-09 DIAGNOSIS — E66.09 CLASS 1 OBESITY DUE TO EXCESS CALORIES WITHOUT SERIOUS COMORBIDITY WITH BODY MASS INDEX (BMI) OF 30.0 TO 30.9 IN ADULT: ICD-10-CM

## 2023-02-09 DIAGNOSIS — Z13.220 SCREENING FOR LIPID DISORDERS: ICD-10-CM

## 2023-02-09 LAB
ANION GAP SERPL CALCULATED.3IONS-SCNC: 10 MMOL/L (ref 7–15)
BUN SERPL-MCNC: 9 MG/DL (ref 6–20)
CALCIUM SERPL-MCNC: 9.6 MG/DL (ref 8.6–10)
CHLORIDE SERPL-SCNC: 104 MMOL/L (ref 98–107)
CHOLEST SERPL-MCNC: 138 MG/DL
CREAT SERPL-MCNC: 0.71 MG/DL (ref 0.67–1.17)
DEPRECATED HCO3 PLAS-SCNC: 28 MMOL/L (ref 22–29)
GFR SERPL CREATININE-BSD FRML MDRD: >90 ML/MIN/1.73M2
GLUCOSE SERPL-MCNC: 82 MG/DL (ref 70–99)
HBA1C MFR BLD: 5.3 % (ref 0–5.6)
HCV AB SERPL QL IA: NONREACTIVE
HDLC SERPL-MCNC: 59 MG/DL
HIV 1+2 AB+HIV1 P24 AG SERPL QL IA: NONREACTIVE
LDLC SERPL CALC-MCNC: 64 MG/DL
NONHDLC SERPL-MCNC: 79 MG/DL
POTASSIUM SERPL-SCNC: 4.1 MMOL/L (ref 3.4–5.3)
SODIUM SERPL-SCNC: 142 MMOL/L (ref 136–145)
T PALLIDUM AB SER QL: NONREACTIVE
TRIGL SERPL-MCNC: 76 MG/DL

## 2023-02-09 PROCEDURE — 80061 LIPID PANEL: CPT | Performed by: FAMILY MEDICINE

## 2023-02-09 PROCEDURE — 87389 HIV-1 AG W/HIV-1&-2 AB AG IA: CPT | Performed by: FAMILY MEDICINE

## 2023-02-09 PROCEDURE — 86780 TREPONEMA PALLIDUM: CPT | Performed by: FAMILY MEDICINE

## 2023-02-09 PROCEDURE — 36415 COLL VENOUS BLD VENIPUNCTURE: CPT | Performed by: FAMILY MEDICINE

## 2023-02-09 PROCEDURE — 87591 N.GONORRHOEAE DNA AMP PROB: CPT | Performed by: FAMILY MEDICINE

## 2023-02-09 PROCEDURE — 99385 PREV VISIT NEW AGE 18-39: CPT | Performed by: FAMILY MEDICINE

## 2023-02-09 PROCEDURE — 87491 CHLMYD TRACH DNA AMP PROBE: CPT | Performed by: FAMILY MEDICINE

## 2023-02-09 PROCEDURE — 86803 HEPATITIS C AB TEST: CPT | Performed by: FAMILY MEDICINE

## 2023-02-09 PROCEDURE — 80048 BASIC METABOLIC PNL TOTAL CA: CPT | Performed by: FAMILY MEDICINE

## 2023-02-09 PROCEDURE — 83036 HEMOGLOBIN GLYCOSYLATED A1C: CPT | Performed by: FAMILY MEDICINE

## 2023-02-09 ASSESSMENT — ENCOUNTER SYMPTOMS
SORE THROAT: 0
DIARRHEA: 0
PALPITATIONS: 0
EYE PAIN: 0
COUGH: 0
WEAKNESS: 0
ABDOMINAL PAIN: 1
CHILLS: 0
JOINT SWELLING: 0
PARESTHESIAS: 0
NAUSEA: 0
HEMATOCHEZIA: 0
DYSURIA: 0
CONSTIPATION: 0
MYALGIAS: 1
SHORTNESS OF BREATH: 0
NERVOUS/ANXIOUS: 0
HEMATURIA: 0
HEARTBURN: 0
ARTHRALGIAS: 0
FREQUENCY: 0
HEADACHES: 0
FEVER: 0
DIZZINESS: 0

## 2023-02-09 ASSESSMENT — PAIN SCALES - GENERAL: PAINLEVEL: NO PAIN (0)

## 2023-02-09 NOTE — PROGRESS NOTES
SUBJECTIVE:   CC: Arturo is an 32 year old who presents for preventative health visit.     Patient has been advised of split billing requirements and indicates understanding: Yes  Healthy Habits:     Getting at least 3 servings of Calcium per day:  Yes    Bi-annual eye exam:  NO    Dental care twice a year:  Yes    Sleep apnea or symptoms of sleep apnea:  None    Diet:  Regular (no restrictions)    Frequency of exercise:  2-3 days/week    Duration of exercise:  15-30 minutes    Taking medications regularly:  Yes    Medication side effects:  Not applicable    PHQ-2 Total Score: 0    Additional concerns today:  Yes    Rectal pain-momentraily- started at work- jan 19th 2023.  No rectal bleeding, no constipation.  dul lmomentary pain.    Known history of anxiety and attention deficit hyperactivity disorder- no new concerns  Medications under care of psychiatrist.    Wants to complete the screening for sexually transmitted infection       Today's PHQ-2 Score:   PHQ-2 ( 1999 [employer]) 2/3/2023   Q1: Little interest or pleasure in doing things 0   Q2: Feeling down, depressed or hopeless 0   PHQ-2 Score 0   Q1: Little interest or pleasure in doing things Not at all   Q2: Feeling down, depressed or hopeless Not at all   PHQ-2 Score 0       Have you ever done Advance Care Planning? (For example, a Health Directive, POLST, or a discussion with a medical provider or your loved ones about your wishes): No, advance care planning information given to patient to review.  Advanced care planning was discussed at today's visit.    Social History     Tobacco Use     Smoking status: Former     Types: Cigarettes     Quit date: 1/1/2013     Years since quitting: 10.1     Smokeless tobacco: Never   Substance Use Topics     Alcohol use: No     If you drink alcohol do you typically have >3 drinks per day or >7 drinks per week? No    No flowsheet data found.    Last PSA: No results found for: PSA    Reviewed orders with patient. Reviewed health  "maintenance and updated orders accordingly - Yes  BP Readings from Last 3 Encounters:   02/09/23 133/79   06/16/21 131/85   10/28/20 125/75    Wt Readings from Last 3 Encounters:   02/09/23 107.2 kg (236 lb 6.4 oz)   09/24/20 102.1 kg (225 lb)   09/06/18 100.3 kg (221 lb 3.2 oz)                    Reviewed and updated as needed this visit by clinical staff   Tobacco  Allergies  Meds  Problems  Med Hx  Surg Hx  Fam Hx          Reviewed and updated as needed this visit by Provider   Tobacco  Allergies  Meds  Problems  Med Hx  Surg Hx  Fam Hx             Review of Systems   Constitutional: Negative for chills and fever.   HENT: Negative for congestion, ear pain, hearing loss and sore throat.    Eyes: Negative for pain and visual disturbance.   Respiratory: Negative for cough and shortness of breath.    Cardiovascular: Negative for chest pain, palpitations and peripheral edema.   Gastrointestinal: Positive for abdominal pain. Negative for constipation, diarrhea, heartburn, hematochezia and nausea.   Genitourinary: Negative for dysuria, frequency, genital sores, hematuria, impotence, penile discharge and urgency.   Musculoskeletal: Positive for myalgias. Negative for arthralgias and joint swelling.   Skin: Negative for rash.   Neurological: Negative for dizziness, weakness, headaches and paresthesias.   Psychiatric/Behavioral: Negative for mood changes. The patient is not nervous/anxious.      OBJECTIVE:   /79   Pulse 71   Temp 97.3  F (36.3  C) (Temporal)   Resp 14   Ht 1.88 m (6' 2\")   Wt 107.2 kg (236 lb 6.4 oz)   SpO2 97%   BMI 30.35 kg/m      Physical Exam  GENERAL: healthy, alert and no distress  EYES: Eyes grossly normal to inspection, PERRL and conjunctivae and sclerae normal  HENT: ear canals and TM's normal, nose and mouth without ulcers or lesions  NECK: no adenopathy, no asymmetry, masses, or scars and thyroid normal to palpation  RESP: lungs clear to auscultation - no rales, " rhonchi or wheezes  CV: regular rate and rhythm, normal S1 S2, no S3 or S4, no murmur, click or rub, no peripheral edema and peripheral pulses strong  ABDOMEN: soft, nontender, no hepatosplenomegaly, no masses and bowel sounds normal  MS: no gross musculoskeletal defects noted, no edema  SKIN: no suspicious lesions or rashes  NEURO: Normal strength and tone, mentation intact and speech normal  PSYCH: mentation appears normal, affect normal/bright    Diagnostic Test Results:  Labs reviewed in Epic  No results found for this or any previous visit (from the past 24 hour(s)).    ASSESSMENT/PLAN:   Arturo was seen today for physical and rectal problem.    Diagnoses and all orders for this visit:    Routine general medical examination at a health care facility  -     REVIEW OF HEALTH MAINTENANCE PROTOCOL ORDERS  Vaccinations discussed- patient deferred COVID vaccine today     Attention deficit hyperactivity disorder (ADHD), predominantly inattentive type  Comments:  Under care of [facility]- [facility].Adderall 20 mg BID    Anxiety  Comments:  Under care of [facility]- [facility].Adderall 20 mg BID    Rectal pain  Comments:  jan 19- main, and since then dull ache random  Advised to make separate appointment to discuss it in detail.  No rectal bleeding or constipation    Elevated BP without diagnosis of hypertension  Comments:  recheck periodically  Orders:  -     Basic metabolic panel  (Ca, Cl, CO2, Creat, Gluc, K, Na, BUN); Future  Please see patient instructions     Screen for STD (sexually transmitted disease)  -     HIV Antigen Antibody Combo; Future  -     Hepatitis C Screen Reflex to HCV RNA Quant and Genotype; Future  -     Neisseria gonorrhoeae PCR - Clinic Collect; Future  -     Chlamydia trachomatis PCR; Future  -     Treponema Abs w Reflex to RPR and Titer; Future    Screening for diabetes mellitus  -     Hemoglobin A1c; Future  -     Basic metabolic panel  (Ca, Cl, CO2, Creat, Gluc, K, Na, BUN);  Future    Obesity  encouraged life style changes    Screening for lipid disorders  Comments:  non fasting  Orders:  -     Lipid Profile (Chol, Trig, HDL, LDL calc); Future        Patient has been advised of split billing requirements and indicates understanding: Yes      COUNSELING:   Reviewed preventive health counseling, as reflected in patient instructions       Regular exercise       Healthy diet/nutrition       Vision screening       Hearing screening       Safe sex practices/STD prevention        He reports that he quit smoking about 10 years ago. His smoking use included cigarettes. He has never used smokeless tobacco.        Irma Delacruz MD  [facility]

## 2023-02-09 NOTE — PATIENT INSTRUCTIONS
-Normal  BP is  119/79 or lower. Upper number is systolic Blood pressure (SBP). Lower number is diastolic  Blood pressure (DBP)  -Blood pressure between 120-139/80-89 (prehypertensive blood pressure/ class 1 hypertension )   -Hypertensive is  BP of 140/90 or above and needs treatment with medication.  -life style changes that are beneficial to reach goal of normal Blood pressure   1.Weight loss of 1 kg can reduce systolic Blood pressure  (SBP) by 1 mmHg  2.Health healthy diet (eg DASH dietary pattern can reduce SBP by 11 mmHg)  3.Structured excercise program (150 mins aerobic activity/week can reduce SBP by 5 mmHg)  4.Low salt  diet - very important.(eg: cut by 255 or 1000 mg/day to reduce SBP by 5mmHg)  5. Increase 4-5 serving of fresh vegetables & fruits /day- good source of potassium.      if More than 140/90  after a month of above life style changes  Return visit with MD/provider  for recheck & consideration of medications .       Preventive Health Recommendations  Male Ages 26 - 39    Yearly exam:             See your health care provider every year in order to  o   Review health changes.   o   Discuss preventive care.    o   Review your medicines if your doctor has prescribed any.  You should be tested each year for STDs (sexually transmitted diseases), if you re at risk.   After age 35, talk to your provider about cholesterol testing. If you are at risk for heart disease, have your cholesterol tested at least every 5 years.   If you are at risk for diabetes, you should have a diabetes test (fasting glucose).  Shots: Get a flu shot each year. Get a tetanus shot every 10 years.     Nutrition:  Eat at least 5 servings of fruits and vegetables daily.   Eat whole-grain bread, whole-wheat pasta and brown rice instead of white grains and rice.   Get adequate Calcium and Vitamin D.     Lifestyle  Exercise for at least 150 minutes a week (30 minutes a day, 5 days a week). This will help you control your weight and  prevent disease.   Limit alcohol to one drink per day.   No smoking.   Wear sunscreen to prevent skin cancer.   See your dentist every six months for an exam and cleaning.

## 2023-02-10 LAB
C TRACH DNA SPEC QL NAA+PROBE: NEGATIVE
N GONORRHOEA DNA SPEC QL NAA+PROBE: NEGATIVE

## 2023-03-22 ENCOUNTER — OFFICE VISIT (OUTPATIENT)
Dept: FAMILY MEDICINE | Facility: CLINIC | Age: 33
End: 2023-03-22
Payer: COMMERCIAL

## 2023-03-22 VITALS
HEIGHT: 74 IN | RESPIRATION RATE: 14 BRPM | BODY MASS INDEX: 31.89 KG/M2 | HEART RATE: 67 BPM | TEMPERATURE: 98.7 F | WEIGHT: 248.5 LBS | DIASTOLIC BLOOD PRESSURE: 80 MMHG | SYSTOLIC BLOOD PRESSURE: 128 MMHG | OXYGEN SATURATION: 98 %

## 2023-03-22 DIAGNOSIS — K62.89 RECTAL PAIN: ICD-10-CM

## 2023-03-22 DIAGNOSIS — K59.4 PROCTALGIA FUGAX: Primary | ICD-10-CM

## 2023-03-22 PROCEDURE — 99213 OFFICE O/P EST LOW 20 MIN: CPT | Performed by: FAMILY MEDICINE

## 2023-03-22 ASSESSMENT — PAIN SCALES - GENERAL: PAINLEVEL: NO PAIN (0)

## 2023-03-22 NOTE — LETTER
FOR: Work         RE: Arturo Raman  : 1990    03/22/23              Arturo  is seen today 23 at our clinic.   No work restriction needed.  However please allow appropriate time off for scope- should the pain recurs.        Sincerely,      Irma Delacruz MD

## 2023-03-22 NOTE — PROGRESS NOTES
"  Assessment & Plan     Rectal pain- most likely due to Proctalgia fugax  Plan Initial pain started at work-1/19/2023  Pathophysiology discussed.Most likely benign its benign.  Treatment  is for the symptoms as needed. Sometimes therapy help.  If pain recurs and is of moderate intensity-  Scope is recommended.    - Adult GI  Referral - Procedure Only; Future    No work restriction needed and follow up as abve for scope nd of follow up with me as needed          Irma Delacruz MD  [facility]    Janine Morris is a 32 year old, presenting for the following health issues:  Rectal Problem (Related to WC injury)  No flowsheet data found.  History of Present Illness       Reason for visit:  Rectal Pain  Symptom onset:  More than a month  Symptoms include:  Pains that come and go, from a work injury with starting a .  Symptom intensity:  Mild  Symptom progression:  Staying the same  Had these symptoms before:  No    He eats 2-3 servings of fruits and vegetables daily.He consumes 3 sweetened beverage(s) daily.   He is taking medications regularly.     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  Since then has had a few similar and sometimes mild episode a few more time.   There was a sharp pain in the moment, but since then has had \"phantom pains\" in the area randomly. He denies any noticeable pattern to when the pains occur.         Review of Systems   Constitutional, HEENT, cardiovascular, pulmonary, GI, , musculoskeletal, neuro, skin, endocrine and psych systems are negative, except as otherwise noted.      Objective    /80   Pulse 67   Temp 98.7  F (37.1  C) (Temporal)   Resp 14   Ht 1.88 m (6' 2\")   Wt 112.7 kg (248 lb 8 oz)   SpO2 98%   BMI 31.91 kg/m    Body mass index is 31.91 kg/m .  Physical Exam   GENERAL: healthy, alert " and no distress  RECTAL (male): normal sphincter tone, no rectal masses, prostate normal size, smooth, nontender without nodules or masses

## 2023-05-07 ENCOUNTER — HEALTH MAINTENANCE LETTER (OUTPATIENT)
Age: 33
End: 2023-05-07

## 2024-01-10 ENCOUNTER — PATIENT OUTREACH (OUTPATIENT)
Dept: CARE COORDINATION | Facility: CLINIC | Age: 34
End: 2024-01-10
Payer: COMMERCIAL

## 2024-01-24 ENCOUNTER — PATIENT OUTREACH (OUTPATIENT)
Dept: CARE COORDINATION | Facility: CLINIC | Age: 34
End: 2024-01-24
Payer: COMMERCIAL

## 2024-04-26 SDOH — HEALTH STABILITY: PHYSICAL HEALTH: ON AVERAGE, HOW MANY MINUTES DO YOU ENGAGE IN EXERCISE AT THIS LEVEL?: 120 MIN

## 2024-04-26 SDOH — HEALTH STABILITY: PHYSICAL HEALTH: ON AVERAGE, HOW MANY DAYS PER WEEK DO YOU ENGAGE IN MODERATE TO STRENUOUS EXERCISE (LIKE A BRISK WALK)?: 6 DAYS

## 2024-04-26 ASSESSMENT — SOCIAL DETERMINANTS OF HEALTH (SDOH): HOW OFTEN DO YOU GET TOGETHER WITH FRIENDS OR RELATIVES?: ONCE A WEEK

## 2024-05-01 ENCOUNTER — OFFICE VISIT (OUTPATIENT)
Dept: FAMILY MEDICINE | Facility: CLINIC | Age: 34
End: 2024-05-01
Payer: COMMERCIAL

## 2024-05-01 VITALS
DIASTOLIC BLOOD PRESSURE: 80 MMHG | HEART RATE: 75 BPM | WEIGHT: 263 LBS | TEMPERATURE: 97.4 F | BODY MASS INDEX: 33.75 KG/M2 | OXYGEN SATURATION: 98 % | RESPIRATION RATE: 19 BRPM | HEIGHT: 74 IN | SYSTOLIC BLOOD PRESSURE: 130 MMHG

## 2024-05-01 DIAGNOSIS — F90.0 ATTENTION DEFICIT HYPERACTIVITY DISORDER (ADHD), PREDOMINANTLY INATTENTIVE TYPE: ICD-10-CM

## 2024-05-01 DIAGNOSIS — M77.12 LEFT TENNIS ELBOW: ICD-10-CM

## 2024-05-01 DIAGNOSIS — E66.811 CLASS 1 OBESITY DUE TO EXCESS CALORIES WITHOUT SERIOUS COMORBIDITY WITH BODY MASS INDEX (BMI) OF 30.0 TO 30.9 IN ADULT: ICD-10-CM

## 2024-05-01 DIAGNOSIS — G56.02 CARPAL TUNNEL SYNDROME OF LEFT WRIST: ICD-10-CM

## 2024-05-01 DIAGNOSIS — Z00.00 ROUTINE GENERAL MEDICAL EXAMINATION AT A HEALTH CARE FACILITY: Primary | ICD-10-CM

## 2024-05-01 DIAGNOSIS — F41.9 ANXIETY: ICD-10-CM

## 2024-05-01 DIAGNOSIS — L63.9 ALOPECIA AREATA: ICD-10-CM

## 2024-05-01 DIAGNOSIS — E66.09 CLASS 1 OBESITY DUE TO EXCESS CALORIES WITHOUT SERIOUS COMORBIDITY WITH BODY MASS INDEX (BMI) OF 30.0 TO 30.9 IN ADULT: ICD-10-CM

## 2024-05-01 DIAGNOSIS — Z11.3 SCREEN FOR STD (SEXUALLY TRANSMITTED DISEASE): ICD-10-CM

## 2024-05-01 DIAGNOSIS — F60.3 BORDERLINE PERSONALITY DISORDER (H): ICD-10-CM

## 2024-05-01 DIAGNOSIS — R03.0 ELEVATED BP WITHOUT DIAGNOSIS OF HYPERTENSION: ICD-10-CM

## 2024-05-01 LAB
C TRACH DNA SPEC QL NAA+PROBE: NEGATIVE
HBA1C MFR BLD: 5.3 % (ref 0–5.6)
HIV 1+2 AB+HIV1 P24 AG SERPL QL IA: NONREACTIVE
N GONORRHOEA DNA SPEC QL NAA+PROBE: NEGATIVE
T PALLIDUM AB SER QL: NONREACTIVE

## 2024-05-01 PROCEDURE — 86780 TREPONEMA PALLIDUM: CPT | Performed by: FAMILY MEDICINE

## 2024-05-01 PROCEDURE — 87491 CHLMYD TRACH DNA AMP PROBE: CPT | Performed by: FAMILY MEDICINE

## 2024-05-01 PROCEDURE — 36415 COLL VENOUS BLD VENIPUNCTURE: CPT | Performed by: FAMILY MEDICINE

## 2024-05-01 PROCEDURE — 87591 N.GONORRHOEAE DNA AMP PROB: CPT | Performed by: FAMILY MEDICINE

## 2024-05-01 PROCEDURE — 99395 PREV VISIT EST AGE 18-39: CPT | Performed by: FAMILY MEDICINE

## 2024-05-01 PROCEDURE — 87389 HIV-1 AG W/HIV-1&-2 AB AG IA: CPT | Performed by: FAMILY MEDICINE

## 2024-05-01 PROCEDURE — 84443 ASSAY THYROID STIM HORMONE: CPT | Performed by: FAMILY MEDICINE

## 2024-05-01 PROCEDURE — 80048 BASIC METABOLIC PNL TOTAL CA: CPT | Performed by: FAMILY MEDICINE

## 2024-05-01 PROCEDURE — 87522 HEPATITIS C REVRS TRNSCRPJ: CPT | Performed by: FAMILY MEDICINE

## 2024-05-01 PROCEDURE — 83036 HEMOGLOBIN GLYCOSYLATED A1C: CPT | Performed by: FAMILY MEDICINE

## 2024-05-01 ASSESSMENT — PAIN SCALES - GENERAL: PAINLEVEL: NO PAIN (0)

## 2024-05-01 NOTE — PATIENT INSTRUCTIONS
Preventive Care Advice   This is general advice given by our system to help you stay healthy. However, your care team may have specific advice just for you. Please talk to your care team about your preventive care needs.  Nutrition  Eat 5 or more servings of fruits and vegetables each day.  Try wheat bread, brown rice and whole grain pasta (instead of white bread, rice, and pasta).  Get enough calcium and vitamin D. Check the label on foods and aim for 100% of the RDA (recommended daily allowance).  Lifestyle  Exercise at least 150 minutes each week   (30 minutes a day, 5 days a week).  Do muscle strengthening activities 2 days a week. These help control your weight and prevent disease.  No smoking.  Wear sunscreen to prevent skin cancer.  Have a dental exam and cleaning every 6 months.  Yearly exams  See your health care team every year to talk about:  Any changes in your health.  Any medicines your care team has prescribed.  Preventive care, family planning, and ways to prevent chronic diseases.  Shots (vaccines)   HPV shots (up to age 26), if you've never had them before.  Hepatitis B shots (up to age 59), if you've never had them before.  COVID-19 shot: Get this shot when it's due.  Flu shot: Get a flu shot every year.  Tetanus shot: Get a tetanus shot every 10 years.  Pneumococcal, hepatitis A, and RSV shots: Ask your care team if you need these based on your risk.  Shingles shot (for age 50 and up).  General health tests  Diabetes screening:  Starting at age 35, Get screened for diabetes at least every 3 years.  If you are younger than age 35, ask your care team if you should be screened for diabetes.  Cholesterol test: At age 39, start having a cholesterol test every 5 years, or more often if advised.  Bone density scan (DEXA): At age 50, ask your care team if you should have this scan for osteoporosis (brittle bones).  Hepatitis C: Get tested at least once in your life.  STIs (sexually transmitted  infections)  Before age 24: Ask your care team if you should be screened for STIs.  After age 24: Get screened for STIs if you're at risk. You are at risk for STIs (including HIV) if:  You are sexually active with more than one person.  You don't use condoms every time.  You or a partner was diagnosed with a sexually transmitted infection.  If you are at risk for HIV, ask about PrEP medicine to prevent HIV.  Get tested for HIV at least once in your life, whether you are at risk for HIV or not.  Cancer screening tests  Cervical cancer screening: If you have a cervix, begin getting regular cervical cancer screening tests at age 21. Most people who have regular screenings with normal results can stop after age 65. Talk about this with your provider.  Breast cancer scan (mammogram): If you've ever had breasts, begin having regular mammograms starting at age 40. This is a scan to check for breast cancer.  Colon cancer screening: It is important to start screening for colon cancer at age 45.  Have a colonoscopy test every 10 years (or more often if you're at risk) Or, ask your provider about stool tests like a FIT test every year or Cologuard test every 3 years.  To learn more about your testing options, visit: [email or web address]/904698.pdf.  For help making a decision, visit: https://bit.ly/mi53331.  Prostate cancer screening test: If you have a prostate and are age 55 to 69, ask your provider if you would benefit from a yearly prostate cancer screening test.  Lung cancer screening: If you are a current or former smoker age 50 to 80, ask your care team if ongoing lung cancer screenings are right for you.  For informational purposes only. Not to replace the advice of your health care provider. Copyright   2023 [employer]. All rights reserved. Clinically reviewed by the [facility] Transitions Program. [employer] 826962 - REV 01/24.    Safer Sex: Care Instructions  Overview  Safer sex is a way to  reduce your risk of getting a sexually transmitted infection (STI). It can also help prevent pregnancy.  Several products can help you practice safer sex and reduce your chance of STIs. One of the best is a condom. There are internal and external condoms. You can use a special rubber sheet (dental dam) for protection during oral sex. Disposable gloves can keep your hands from touching blood, semen, or other body fluids that can carry infections.  Remember that birth control methods such as diaphragms, IUDs, foams, and birth control pills do not stop you from getting STIs.  Follow-up care is a key part of your treatment and safety. Be sure to make and go to all appointments, and call your doctor if you are having problems. It's also a good idea to know your test results and keep a list of the medicines you take.  How can you care for yourself at home?  Think about getting vaccinated to help prevent hepatitis A, hepatitis B, and human papillomavirus (HPV). They can be spread through sex.  Use a condom every time you have sex. Use an external condom, which goes on the penis. Or use an internal condom, which goes into the vagina or anus.  Make sure you use the right size external condom. A condom that's too small can break easily. A condom that's too big can slip off during sex.  Use a new condom each time you have sex. Be careful not to poke a hole in the condom when you open the wrapper.  Don't use an internal condom and an external condom at the same time.  Never use petroleum jelly (such as Vaseline), grease, hand lotion, baby oil, or anything with oil in it. These products can make holes in the condom.  After intercourse, hold the edge of the condom as you remove it. This will help keep semen from spilling out of the condom.  Do not have sex with anyone who has symptoms of an STI, such as sores on the genitals or mouth.  Do not drink a lot of alcohol or use drugs before sex.  Limit your sex partners. Sex with one  "partner who has sex only with you can reduce your risk of getting an STI.  Don't share sex toys. But if you do share them, use a condom and clean the sex toys between each use.  Talk to any partners before you have sex. Talk about what you feel comfortable with and whether you have any boundaries with sex. And find out if your partner or partners may be at risk for any STI. Keep in mind that a person may be able to spread an STI even if they do not have symptoms. You and any partners may want to get tested for STIs.  Where can you learn more?  Go to https://www.[employer].com.net/patiented  Enter B608 in the search box to learn more about \"Safer Sex: Care Instructions.\"  Current as of: November 27, 2023               Content Version: 14.0    6219-0261 [employer].   Care instructions adapted under license by your healthcare professional. If you have questions about a medical condition or this instruction, always ask your healthcare professional. [employer] disclaims any warranty or liability for your use of this information.      Preventive Care Advice   This is general advice given by our system to help you stay healthy. However, your care team may have specific advice just for you. Please talk to your care team about your preventive care needs.  Nutrition  Eat 5 or more servings of fruits and vegetables each day.  Try wheat bread, brown rice and whole grain pasta (instead of white bread, rice, and pasta).  Get enough calcium and vitamin D. Check the label on foods and aim for 100% of the RDA (recommended daily allowance).  Lifestyle  Exercise at least 150 minutes each week   (30 minutes a day, 5 days a week).  Do muscle strengthening activities 2 days a week. These help control your weight and prevent disease.  No smoking.  Wear sunscreen to prevent skin cancer.  Have a dental exam and cleaning every 6 months.  Yearly exams  See your health care team every year to talk about:  Any changes in " your health.  Any medicines your care team has prescribed.  Preventive care, family planning, and ways to prevent chronic diseases.  Shots (vaccines)   HPV shots (up to age 26), if you've never had them before.  Hepatitis B shots (up to age 59), if you've never had them before.  COVID-19 shot: Get this shot when it's due.  Flu shot: Get a flu shot every year.  Tetanus shot: Get a tetanus shot every 10 years.  Pneumococcal, hepatitis A, and RSV shots: Ask your care team if you need these based on your risk.  Shingles shot (for age 50 and up).  General health tests  Diabetes screening:  Starting at age 35, Get screened for diabetes at least every 3 years.  If you are younger than age 35, ask your care team if you should be screened for diabetes.  Cholesterol test: At age 39, start having a cholesterol test every 5 years, or more often if advised.  Bone density scan (DEXA): At age 50, ask your care team if you should have this scan for osteoporosis (brittle bones).  Hepatitis C: Get tested at least once in your life.  STIs (sexually transmitted infections)  Before age 24: Ask your care team if you should be screened for STIs.  After age 24: Get screened for STIs if you're at risk. You are at risk for STIs (including HIV) if:  You are sexually active with more than one person.  You don't use condoms every time.  You or a partner was diagnosed with a sexually transmitted infection.  If you are at risk for HIV, ask about PrEP medicine to prevent HIV.  Get tested for HIV at least once in your life, whether you are at risk for HIV or not.  Cancer screening tests  Cervical cancer screening: If you have a cervix, begin getting regular cervical cancer screening tests at age 21. Most people who have regular screenings with normal results can stop after age 65. Talk about this with your provider.  Breast cancer scan (mammogram): If you've ever had breasts, begin having regular mammograms starting at age 40. This is a scan to  check for breast cancer.  Colon cancer screening: It is important to start screening for colon cancer at age 45.  Have a colonoscopy test every 10 years (or more often if you're at risk) Or, ask your provider about stool tests like a FIT test every year or Cologuard test every 3 years.  To learn more about your testing options, visit: [email or web address].pdf.  For help making a decision, visit: https://bit.ly/lr35587.  Prostate cancer screening test: If you have a prostate and are age 55 to 69, ask your provider if you would benefit from a yearly prostate cancer screening test.  Lung cancer screening: If you are a current or former smoker age 50 to 80, ask your care team if ongoing lung cancer screenings are right for you.  For informational purposes only. Not to replace the advice of your health care provider. Copyright   2023 [facility]. All rights reserved. Clinically reviewed by the [facility] Transitions Program. [employer] 975952 - REV 01/24.    Safer Sex: Care Instructions  Overview  Safer sex is a way to reduce your risk of getting a sexually transmitted infection (STI). It can also help prevent pregnancy.  Several products can help you practice safer sex and reduce your chance of STIs. One of the best is a condom. There are internal and external condoms. You can use a special rubber sheet (dental dam) for protection during oral sex. Disposable gloves can keep your hands from touching blood, semen, or other body fluids that can carry infections.  Remember that birth control methods such as diaphragms, IUDs, foams, and birth control pills do not stop you from getting STIs.  Follow-up care is a key part of your treatment and safety. Be sure to make and go to all appointments, and call your doctor if you are having problems. It's also a good idea to know your test results and keep a list of the medicines you take.  How can you care for yourself at home?  Think about getting  "vaccinated to help prevent hepatitis A, hepatitis B, and human papillomavirus (HPV). They can be spread through sex.  Use a condom every time you have sex. Use an external condom, which goes on the penis. Or use an internal condom, which goes into the vagina or anus.  Make sure you use the right size external condom. A condom that's too small can break easily. A condom that's too big can slip off during sex.  Use a new condom each time you have sex. Be careful not to poke a hole in the condom when you open the wrapper.  Don't use an internal condom and an external condom at the same time.  Never use petroleum jelly (such as Vaseline), grease, hand lotion, baby oil, or anything with oil in it. These products can make holes in the condom.  After intercourse, hold the edge of the condom as you remove it. This will help keep semen from spilling out of the condom.  Do not have sex with anyone who has symptoms of an STI, such as sores on the genitals or mouth.  Do not drink a lot of alcohol or use drugs before sex.  Limit your sex partners. Sex with one partner who has sex only with you can reduce your risk of getting an STI.  Don't share sex toys. But if you do share them, use a condom and clean the sex toys between each use.  Talk to any partners before you have sex. Talk about what you feel comfortable with and whether you have any boundaries with sex. And find out if your partner or partners may be at risk for any STI. Keep in mind that a person may be able to spread an STI even if they do not have symptoms. You and any partners may want to get tested for STIs.  Where can you learn more?  Go to https://www.[employer].net/patiented  Enter B608 in the search box to learn more about \"Safer Sex: Care Instructions.\"  Current as of: November 27, 2023               Content Version: 14.0    5505-6702 [employer].   Care instructions adapted under license by your healthcare professional. If you have questions " about a medical condition or this instruction, always ask your healthcare professional. [employer] disclaims any warranty or liability for your use of this information.

## 2024-05-01 NOTE — PROGRESS NOTES
"Preventive Care Visit  [facility]  Irma Delacruz MD, Family Medicine  May 1, 2024      Assessment & Plan     1. Routine general medical examination at a health care facility      2. Attention deficit hyperactivity disorder (ADHD), predominantly inattentive type  3. Anxiety/ Borderline personality disorder (H)  Plan: - considered this problem when making today's plans  - no interventions today       -followed by specialist.     4. Elevated BP without diagnosis of hypertension  Plan: Please see patient instructions   Low salt diet, excercise   - Basic metabolic panel  (Ca, Cl, CO2, Creat, Gluc, K, Na, BUN); Future  - Hemoglobin A1c; Future    5. Class 1 obesity due to excess calories without serious comorbidity with body mass index (BMI) of 30.0 to 30.9 in adult  Plan: we discussed life style changes  Making food plan    6. Screen for STD (sexually transmitted disease)  Plan:  - Chlamydia trachomatis PCR; Future  - HIV Antigen Antibody Combo Cascade; Future  - Neisseria gonorrhoeae PCR; Future  - Treponema Abs w Reflex to RPR and Titer; Future  - Hepatitis C RNA, Quantitative by PCR with Confirmatory Reflex to Genotyping; Future    7. Alopecia areata  Plan: has seen derm- hair loss patches on face & was advised to check thyroid   - TSH with free T4 reflex; Future        Patient has been advised of split billing requirements and indicates understanding: Yes  Ordering of each unique test        BMI  Estimated body mass index is 33.77 kg/m  as calculated from the following:    Height as of this encounter: 1.88 m (6' 2\").    Weight as of this encounter: 119.3 kg (263 lb).   Weight management plan: Discussed healthy diet and exercise guidelines    Counseling  Appropriate preventive services were discussed with this patient, including applicable screening as appropriate for fall prevention, nutrition, physical activity, Tobacco-use cessation, weight loss and cognition.  Checklist reviewing " preventive services available has been given to the patient.  Reviewed patient's diet, addressing concerns and/or questions.       Work on weight loss  Regular exercise    Janine Morris is a 33 year old, presenting for the following:  Physical (Carpal tunnel/Tennis elbow /Left side )        5/1/2024     7:08 AM   Additional Questions   Roomed by jami byrd   Accompanied by clemente         5/1/2024     7:08 AM   Patient Reported Additional Medications   Patient reports taking the following new medications n/a        Health Care Directive  Patient does not have a Health Care Directive or Living Will: Discussed advance care planning with patient; information given to patient to review.    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  Known history of anxiety and attention deficit hyperactivity disorder   Has therapist and MH provier for medication management   Wants to get sexually transmitted infection screening completed as well    Left dominant hand, wakes up in am- burning pain rom upper forarm         4/26/2024   General Health   How would you rate your overall physical health? Good   Feel stress (tense, anxious, or unable to sleep) Not at all         4/26/2024   Nutrition   Three or more servings of calcium each day? (!) I DON'T KNOW   Diet: Regular (no restrictions)   How many servings of fruit and vegetables per day? (!) 0-1   How many sweetened beverages each day? 0-1         4/26/2024   Exercise   Days per week of moderate/strenous exercise 6 days   Average minutes spent exercising at this level 120 min         4/26/2024   Social Factors   Frequency of gathering with friends or relatives Once a week   Worry food won't last until get money to buy more No   Food not last or not have enough money for food? No   Do you have housing?  Yes   Are you worried about losing your housing? No   Lack of transportation? No   Unable  "to get utilities (heat,electricity)? No         2024   Dental   Dentist two times every year? Yes         2024   TB Screening   Were you born outside of the [place]? No         Today's PHQ-2 Score:       2024     7:02 AM   PHQ-2 (  [employer])   Q1: Little interest or pleasure in doing things 0   Q2: Feeling down, depressed or hopeless 0   PHQ-2 Score 0   Q1: Little interest or pleasure in doing things Not at all   Q2: Feeling down, depressed or hopeless Not at all   PHQ-2 Score 0           2024   Substance Use   Alcohol more than 3/day or more than 7/wk No   Do you use any other substances recreationally? No     Social History     Tobacco Use    Smoking status: Former     Current packs/day: 0.00     Types: Cigarettes     Quit date: 2013     Years since quittin.3    Smokeless tobacco: Never   Vaping Use    Vaping status: Never Used   Substance Use Topics    Alcohol use: No    Drug use: No           2024   STI Screening   New sexual partner(s) since last STI/HIV test? (!) YES          2024   Contraception/Family Planning   Questions about contraception or family planning No       Reviewed and updated as needed this visit by Provider   Tobacco  Allergies  Meds  Problems  Med Hx  Surg Hx  Fam Hx            BP Readings from Last 3 Encounters:   24 130/80   23 128/80   23 133/79    Wt Readings from Last 3 Encounters:   24 119.3 kg (263 lb)   23 112.7 kg (248 lb 8 oz)   23 107.2 kg (236 lb 6.4 oz)                      Review of Systems  Constitutional, HEENT, cardiovascular, pulmonary, GI, , musculoskeletal, neuro, skin, endocrine and psych systems are negative, except as otherwise noted.     Objective    Exam  /80 (BP Location: Left arm, Patient Position: Sitting, Cuff Size: Adult Regular)   Pulse 75   Temp 97.4  F (36.3  C) (Temporal)   Resp 19   Ht 1.88 m (6' 2\")   Wt 119.3 kg (263 lb)   SpO2 98%   BMI 33.77 kg/m     Estimated " "body mass index is 33.77 kg/m  as calculated from the following:    Height as of this encounter: 1.88 m (6' 2\").    Weight as of this encounter: 119.3 kg (263 lb).    Physical Exam  GENERAL: alert and no distress  EYES: Eyes grossly normal to inspection, PERRL and conjunctivae and sclerae normal  HENT: ear canals and TM's normal, nose and mouth without ulcers or lesions  NECK: no adenopathy, no asymmetry, masses, or scars  RESP: lungs clear to auscultation - no rales, rhonchi or wheezes  CV: regular rate and rhythm, normal S1 S2, no S3 or S4, no murmur, click or rub, no peripheral edema  ABDOMEN: soft, nontender, no hepatosplenomegaly, no masses and bowel sounds normal  MS: no gross musculoskeletal defects noted, no edema  SKIN: no suspicious lesions or rashes  NEURO: Normal strength and tone, mentation intact and speech normal  PSYCH: mentation appears normal, affect normal/bright        Signed Electronically by: Irma Delacruz MD    "

## 2024-05-02 LAB
ANION GAP SERPL CALCULATED.3IONS-SCNC: 10 MMOL/L (ref 7–15)
BUN SERPL-MCNC: 10.3 MG/DL (ref 6–20)
CALCIUM SERPL-MCNC: 9.2 MG/DL (ref 8.6–10)
CHLORIDE SERPL-SCNC: 103 MMOL/L (ref 98–107)
CREAT SERPL-MCNC: 0.74 MG/DL (ref 0.67–1.17)
DEPRECATED HCO3 PLAS-SCNC: 26 MMOL/L (ref 22–29)
EGFRCR SERPLBLD CKD-EPI 2021: >90 ML/MIN/1.73M2
GLUCOSE SERPL-MCNC: 94 MG/DL (ref 70–99)
HCV RNA SERPL NAA+PROBE-ACNC: NOT DETECTED IU/ML
POTASSIUM SERPL-SCNC: 4.2 MMOL/L (ref 3.4–5.3)
SODIUM SERPL-SCNC: 139 MMOL/L (ref 135–145)
TSH SERPL DL<=0.005 MIU/L-ACNC: 1.32 UIU/ML (ref 0.3–4.2)

## 2024-12-16 ENCOUNTER — OFFICE VISIT (OUTPATIENT)
Dept: URGENT CARE | Facility: URGENT CARE | Age: 34
End: 2024-12-16
Payer: COMMERCIAL

## 2024-12-16 ENCOUNTER — ANCILLARY PROCEDURE (OUTPATIENT)
Dept: GENERAL RADIOLOGY | Facility: CLINIC | Age: 34
End: 2024-12-16
Attending: PREVENTIVE MEDICINE
Payer: COMMERCIAL

## 2024-12-16 ENCOUNTER — ANCILLARY PROCEDURE (OUTPATIENT)
Dept: CT IMAGING | Facility: CLINIC | Age: 34
End: 2024-12-16
Attending: PREVENTIVE MEDICINE
Payer: COMMERCIAL

## 2024-12-16 ENCOUNTER — OFFICE VISIT (OUTPATIENT)
Dept: PEDIATRICS | Facility: CLINIC | Age: 34
End: 2024-12-16
Payer: COMMERCIAL

## 2024-12-16 VITALS
TEMPERATURE: 96.5 F | OXYGEN SATURATION: 96 % | DIASTOLIC BLOOD PRESSURE: 83 MMHG | WEIGHT: 271 LBS | BODY MASS INDEX: 34.79 KG/M2 | RESPIRATION RATE: 16 BRPM | HEART RATE: 71 BPM | SYSTOLIC BLOOD PRESSURE: 121 MMHG

## 2024-12-16 VITALS
RESPIRATION RATE: 20 BRPM | HEART RATE: 61 BPM | TEMPERATURE: 97.7 F | BODY MASS INDEX: 34.78 KG/M2 | WEIGHT: 271 LBS | HEIGHT: 74 IN | DIASTOLIC BLOOD PRESSURE: 83 MMHG | SYSTOLIC BLOOD PRESSURE: 121 MMHG | OXYGEN SATURATION: 99 %

## 2024-12-16 DIAGNOSIS — S70.02XA CONTUSION OF LEFT HIP, INITIAL ENCOUNTER: ICD-10-CM

## 2024-12-16 DIAGNOSIS — S09.90XA CLOSED HEAD INJURY, INITIAL ENCOUNTER: ICD-10-CM

## 2024-12-16 DIAGNOSIS — R10.2 PELVIC PAIN IN MALE: ICD-10-CM

## 2024-12-16 DIAGNOSIS — M54.50 ACUTE BILATERAL LOW BACK PAIN WITHOUT SCIATICA: ICD-10-CM

## 2024-12-16 DIAGNOSIS — S09.90XA CLOSED HEAD INJURY, INITIAL ENCOUNTER: Primary | ICD-10-CM

## 2024-12-16 DIAGNOSIS — S06.0XAA CONCUSSION WITH UNKNOWN LOSS OF CONSCIOUSNESS STATUS, INITIAL ENCOUNTER: ICD-10-CM
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
## 2024-12-16 DIAGNOSIS — R51.9 ACUTE NONINTRACTABLE HEADACHE, UNSPECIFIED HEADACHE TYPE: ICD-10-CM

## 2024-12-16 LAB
ALBUMIN SERPL BCG-MCNC: 4.3 G/DL (ref 3.5–5.2)
ALP SERPL-CCNC: 85 U/L (ref 40–150)
ALT SERPL W P-5'-P-CCNC: 51 U/L (ref 0–70)
ANION GAP SERPL CALCULATED.3IONS-SCNC: 9 MMOL/L (ref 7–15)
AST SERPL W P-5'-P-CCNC: 32 U/L (ref 0–45)
BASOPHILS # BLD AUTO: 0 10E3/UL (ref 0–0.2)
BASOPHILS NFR BLD AUTO: 1 %
BILIRUB SERPL-MCNC: 0.4 MG/DL
BUN SERPL-MCNC: 11 MG/DL (ref 6–20)
CALCIUM SERPL-MCNC: 9 MG/DL (ref 8.8–10.4)
CHLORIDE SERPL-SCNC: 104 MMOL/L (ref 98–107)
CREAT SERPL-MCNC: 0.79 MG/DL (ref 0.67–1.17)
EGFRCR SERPLBLD CKD-EPI 2021: >90 ML/MIN/1.73M2
EOSINOPHIL # BLD AUTO: 0.2 10E3/UL (ref 0–0.7)
EOSINOPHIL NFR BLD AUTO: 2 %
ERYTHROCYTE [DISTWIDTH] IN BLOOD BY AUTOMATED COUNT: 12.8 % (ref 10–15)
GLUCOSE SERPL-MCNC: 87 MG/DL (ref 70–99)
HCO3 SERPL-SCNC: 26 MMOL/L (ref 22–29)
HCT VFR BLD AUTO: 45.2 % (ref 40–53)
HGB BLD-MCNC: 14.5 G/DL (ref 13.3–17.7)
IMM GRANULOCYTES # BLD: 0 10E3/UL
IMM GRANULOCYTES NFR BLD: 0 %
LYMPHOCYTES # BLD AUTO: 1.6 10E3/UL (ref 0.8–5.3)
LYMPHOCYTES NFR BLD AUTO: 24 %
MCH RBC QN AUTO: 27.1 PG (ref 26.5–33)
MCHC RBC AUTO-ENTMCNC: 32.1 G/DL (ref 31.5–36.5)
MCV RBC AUTO: 84 FL (ref 78–100)
MONOCYTES # BLD AUTO: 0.5 10E3/UL (ref 0–1.3)
MONOCYTES NFR BLD AUTO: 7 %
NEUTROPHILS # BLD AUTO: 4.4 10E3/UL (ref 1.6–8.3)
NEUTROPHILS NFR BLD AUTO: 66 %
PLATELET # BLD AUTO: 271 10E3/UL (ref 150–450)
POTASSIUM SERPL-SCNC: 4.4 MMOL/L (ref 3.4–5.3)
PROT SERPL-MCNC: 7.1 G/DL (ref 6.4–8.3)
RBC # BLD AUTO: 5.36 10E6/UL (ref 4.4–5.9)
SODIUM SERPL-SCNC: 139 MMOL/L (ref 135–145)
WBC # BLD AUTO: 6.6 10E3/UL (ref 4–11)

## 2024-12-16 PROCEDURE — 73522 X-RAY EXAM HIPS BI 3-4 VIEWS: CPT | Mod: TC | Performed by: RADIOLOGY

## 2024-12-16 PROCEDURE — 70450 CT HEAD/BRAIN W/O DYE: CPT

## 2024-12-16 PROCEDURE — 36415 COLL VENOUS BLD VENIPUNCTURE: CPT | Performed by: PREVENTIVE MEDICINE

## 2024-12-16 PROCEDURE — 99207 PR FIRST ORDER ACUTE REFERRAL: CPT | Performed by: NURSE PRACTITIONER

## 2024-12-16 PROCEDURE — 85025 COMPLETE CBC W/AUTO DIFF WBC: CPT | Performed by: PREVENTIVE MEDICINE

## 2024-12-16 PROCEDURE — 99215 OFFICE O/P EST HI 40 MIN: CPT | Performed by: PREVENTIVE MEDICINE

## 2024-12-16 PROCEDURE — 80053 COMPREHEN METABOLIC PANEL: CPT | Performed by: PREVENTIVE MEDICINE

## 2024-12-16 ASSESSMENT — PAIN SCALES - GENERAL: PAINLEVEL_OUTOF10: SEVERE PAIN (6)
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
## 2024-12-20 ENCOUNTER — TELEPHONE (OUTPATIENT)
Dept: FAMILY MEDICINE | Facility: CLINIC | Age: 34
End: 2024-12-20
Payer: COMMERCIAL
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
## 2024-12-23 ENCOUNTER — NURSE TRIAGE (OUTPATIENT)
Dept: FAMILY MEDICINE | Facility: CLINIC | Age: 34
End: 2024-12-23
Payer: COMMERCIAL

## 2024-12-23 NOTE — TELEPHONE ENCOUNTER
"Numb and weak- right arm. Had blood drawn on 12/16/24 since then numbness, weak, swelling ( size of softball), bruising increase, unable to move arm or left a coffee mug.    Informed patient to go to the ER now.     Kierra Gutiérrez RN      Reason for Disposition   Entire arm is swollen    Additional Information   Negative: Shock suspected (e.g., cold/pale/clammy skin, too weak to stand, low BP, rapid pulse)   Negative: Similar pain previously and it was from 'heart attack'   Negative: Similar pain previously from 'angina' and not relieved by nitroglycerin   Negative: Sounds like a life-threatening emergency to the triager   Negative: Followed an injury to arm   Negative: Chest pain   Negative: Wound looks infected (e.g., spreading redness, pus)   Negative: Elbow pain is main symptom   Negative: Wrist pain is main symptom   Negative: Difficulty breathing or unusual sweating (e.g., sweating without exertion)   Negative: Chest pain lasting longer than 5 minutes   Negative: Age > 40 and no obvious cause for pain, pain still present even when not moving the arm    Answer Assessment - Initial Assessment Questions  1. ONSET: \"When did the pain start?\"      12/17/24 after a blood draw  2. LOCATION: \"Where is the pain located?\"      Right arm/ elbow area  3. PAIN: \"How bad is the pain?\" (Scale 0-10; or none, mild, moderate, severe)      Severe   4. WORK OR EXERCISE: \"Has there been any recent work or exercise that involved this part of the body?\"      no  5. CAUSE: \"What do you think is causing the arm pain?\"      Blood drawn  6. OTHER SYMPTOMS: \"Do you have any other symptoms?\" (e.g., neck pain, swelling, rash, fever, numbness, weakness)      Numbness, weakness, swelling the size of a softball    Protocols used: Arm Pain-A-OH    Kierra Gutiérrez RN    "

## 2024-12-24 ENCOUNTER — APPOINTMENT (OUTPATIENT)
Dept: ULTRASOUND IMAGING | Facility: CLINIC | Age: 34
End: 2024-12-24
Attending: EMERGENCY MEDICINE
Payer: COMMERCIAL

## 2024-12-24 ENCOUNTER — HOSPITAL ENCOUNTER (EMERGENCY)
Facility: CLINIC | Age: 34
Discharge: HOME | End: 2024-12-24
Attending: EMERGENCY MEDICINE
Payer: COMMERCIAL

## 2024-12-24 ENCOUNTER — APPOINTMENT (OUTPATIENT)
Dept: GENERAL RADIOLOGY | Facility: CLINIC | Age: 34
End: 2024-12-24
Attending: EMERGENCY MEDICINE
Payer: COMMERCIAL

## 2024-12-24 VITALS
TEMPERATURE: 97.9 F | DIASTOLIC BLOOD PRESSURE: 89 MMHG | HEART RATE: 80 BPM | OXYGEN SATURATION: 99 % | RESPIRATION RATE: 16 BRPM | SYSTOLIC BLOOD PRESSURE: 123 MMHG

## 2024-12-24 DIAGNOSIS — S40.021A ARM BRUISE, RIGHT, INITIAL ENCOUNTER: ICD-10-CM

## 2024-12-24 DIAGNOSIS — R20.0 NUMBNESS AND TINGLING OF RIGHT ARM: ICD-10-CM

## 2024-12-24 DIAGNOSIS — R20.2 NUMBNESS AND TINGLING OF RIGHT ARM: ICD-10-CM
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
## 2024-12-24 DIAGNOSIS — M79.621 PAIN OF RIGHT UPPER ARM: ICD-10-CM

## 2024-12-24 PROCEDURE — 93971 EXTREMITY STUDY: CPT | Mod: RT

## 2024-12-24 PROCEDURE — 73060 X-RAY EXAM OF HUMERUS: CPT | Mod: RT

## 2024-12-24 PROCEDURE — 99284 EMERGENCY DEPT VISIT MOD MDM: CPT | Mod: 25

## 2024-12-24 PROCEDURE — 250N000013 HC RX MED GY IP 250 OP 250 PS 637: Performed by: EMERGENCY MEDICINE

## 2024-12-24 PROCEDURE — 73090 X-RAY EXAM OF FOREARM: CPT | Mod: RT

## 2024-12-24 PROCEDURE — 73030 X-RAY EXAM OF SHOULDER: CPT | Mod: RT

## 2024-12-24 RX ORDER — ACETAMINOPHEN 500 MG
1000 TABLET ORAL ONCE
Status: COMPLETED | OUTPATIENT
Start: 2024-12-24 | End: 2024-12-24

## 2024-12-24 RX ORDER — IBUPROFEN 600 MG/1
600 TABLET, FILM COATED ORAL ONCE
Status: COMPLETED | OUTPATIENT
Start: 2024-12-24 | End: 2024-12-24

## 2024-12-24 RX ADMIN — IBUPROFEN 600 MG: 600 TABLET ORAL at 07:43

## 2024-12-24 RX ADMIN — ACETAMINOPHEN 1000 MG: 500 TABLET, FILM COATED ORAL at 07:43

## 2024-12-24 ASSESSMENT — ACTIVITIES OF DAILY LIVING (ADL)
ADLS_ACUITY_SCORE: 41
ADLS_ACUITY_SCORE: 41

## 2024-12-24 ASSESSMENT — COLUMBIA-SUICIDE SEVERITY RATING SCALE - C-SSRS
2. HAVE YOU ACTUALLY HAD ANY THOUGHTS OF KILLING YOURSELF IN THE PAST MONTH?: NO
1. IN THE PAST MONTH, HAVE YOU WISHED YOU WERE DEAD OR WISHED YOU COULD GO TO SLEEP AND NOT WAKE UP?: NO
6. HAVE YOU EVER DONE ANYTHING, STARTED TO DO ANYTHING, OR PREPARED TO DO ANYTHING TO END YOUR LIFE?: NO
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
## 2024-12-31 ENCOUNTER — OFFICE VISIT (OUTPATIENT)
Dept: FAMILY MEDICINE | Facility: CLINIC | Age: 34
End: 2024-12-31
Payer: COMMERCIAL

## 2024-12-31 VITALS
OXYGEN SATURATION: 97 % | RESPIRATION RATE: 16 BRPM | WEIGHT: 265.2 LBS | BODY MASS INDEX: 34.03 KG/M2 | TEMPERATURE: 97 F | SYSTOLIC BLOOD PRESSURE: 120 MMHG | DIASTOLIC BLOOD PRESSURE: 84 MMHG | HEIGHT: 74 IN | HEART RATE: 78 BPM
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
## 2024-12-31 DIAGNOSIS — M54.6 ACUTE RIGHT-SIDED THORACIC BACK PAIN: ICD-10-CM

## 2024-12-31 DIAGNOSIS — M54.50 ACUTE LEFT-SIDED LOW BACK PAIN WITHOUT SCIATICA: ICD-10-CM

## 2024-12-31 DIAGNOSIS — S46.211S STRAIN OF MUSCLE, FASCIA AND TENDON OF OTHER PARTS OF BICEPS, RIGHT ARM, SEQUELA: ICD-10-CM

## 2024-12-31 PROCEDURE — 99214 OFFICE O/P EST MOD 30 MIN: CPT | Performed by: FAMILY MEDICINE

## 2024-12-31 ASSESSMENT — PAIN SCALES - GENERAL: PAINLEVEL_OUTOF10: MILD PAIN (2)

## 2024-12-31 NOTE — LETTER
2024    Arturo Raman   1990            To Whom it May Concern;          Please excuse Arturo Raman from work for a healthcare visit on Dec 31, 2024.          Sincerely,        Irma Delacruz MD
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
## 2024-12-31 NOTE — LETTER
December 31, 2024      Arturo Raman  [address]  [place] [facility] [place] [address]        To Whom It May Concern:    Arturo Raman was seen in our clinic. He may return to work with the following: limited to light duty - lifting no greater than 10 pounds, no use of arms over shoulders, and no bending/stooping and may not operate heavy equipment  on or about 12/31/24 till 1/19/24.      Sincerely,      Irma Delacruz      Electronically signed

## 2025-02-07 ENCOUNTER — ANCILLARY PROCEDURE (OUTPATIENT)
Dept: GENERAL RADIOLOGY | Facility: CLINIC | Age: 35
End: 2025-02-07
Attending: FAMILY MEDICINE
Payer: COMMERCIAL

## 2025-02-07 ENCOUNTER — OFFICE VISIT (OUTPATIENT)
Dept: FAMILY MEDICINE | Facility: CLINIC | Age: 35
End: 2025-02-07
Payer: COMMERCIAL

## 2025-02-07 VITALS
DIASTOLIC BLOOD PRESSURE: 90 MMHG | WEIGHT: 273.2 LBS | HEART RATE: 74 BPM | TEMPERATURE: 97.1 F | HEIGHT: 75 IN | SYSTOLIC BLOOD PRESSURE: 149 MMHG | RESPIRATION RATE: 18 BRPM | OXYGEN SATURATION: 98 % | BODY MASS INDEX: 33.97 KG/M2

## 2025-02-07 DIAGNOSIS — M62.830 SPASM OF MUSCLE OF LOWER BACK: ICD-10-CM

## 2025-02-07 DIAGNOSIS — M62.838 NECK MUSCLE SPASM: ICD-10-CM
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
## 2025-02-07 DIAGNOSIS — T14.8XXA CRUSH INJURY: ICD-10-CM

## 2025-02-07 DIAGNOSIS — S60.011A CONTUSION OF RIGHT THUMB WITHOUT DAMAGE TO NAIL, INITIAL ENCOUNTER: ICD-10-CM

## 2025-02-07 DIAGNOSIS — G44.209 TENSION HEADACHE: ICD-10-CM

## 2025-02-07 PROCEDURE — 99215 OFFICE O/P EST HI 40 MIN: CPT | Performed by: FAMILY MEDICINE

## 2025-02-07 PROCEDURE — 73140 X-RAY EXAM OF FINGER(S): CPT | Mod: TC | Performed by: RADIOLOGY

## 2025-02-07 ASSESSMENT — PAIN SCALES - GENERAL: PAINLEVEL_OUTOF10: MODERATE PAIN (6)

## 2025-02-10 PROBLEM — F60.3 BORDERLINE PERSONALITY DISORDER (H): Status: RESOLVED | Noted: 2024-05-01 | Resolved: 2025-02-10
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
## 2025-02-12 ENCOUNTER — THERAPY VISIT (OUTPATIENT)
Dept: PHYSICAL THERAPY | Facility: CLINIC | Age: 35
End: 2025-02-12
Attending: FAMILY MEDICINE
Payer: COMMERCIAL

## 2025-02-12 DIAGNOSIS — M54.50 ACUTE LEFT-SIDED LOW BACK PAIN WITHOUT SCIATICA: ICD-10-CM
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
## 2025-02-12 DIAGNOSIS — M54.6 ACUTE RIGHT-SIDED THORACIC BACK PAIN: ICD-10-CM

## 2025-02-12 DIAGNOSIS — S46.211S STRAIN OF MUSCLE, FASCIA AND TENDON OF OTHER PARTS OF BICEPS, RIGHT ARM, SEQUELA: ICD-10-CM

## 2025-02-12 PROCEDURE — 97110 THERAPEUTIC EXERCISES: CPT | Mod: GP

## 2025-02-12 PROCEDURE — 97162 PT EVAL MOD COMPLEX 30 MIN: CPT | Mod: GP
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
## 2025-02-19 ENCOUNTER — THERAPY VISIT (OUTPATIENT)
Dept: PHYSICAL THERAPY | Facility: CLINIC | Age: 35
End: 2025-02-19
Attending: FAMILY MEDICINE
Payer: COMMERCIAL
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
## 2025-02-19 DIAGNOSIS — M54.6 ACUTE RIGHT-SIDED THORACIC BACK PAIN: ICD-10-CM

## 2025-02-19 DIAGNOSIS — S46.211S STRAIN OF MUSCLE, FASCIA AND TENDON OF OTHER PARTS OF BICEPS, RIGHT ARM, SEQUELA: ICD-10-CM

## 2025-02-19 DIAGNOSIS — M54.50 ACUTE LEFT-SIDED LOW BACK PAIN WITHOUT SCIATICA: ICD-10-CM

## 2025-02-20 ENCOUNTER — OFFICE VISIT (OUTPATIENT)
Dept: FAMILY MEDICINE | Facility: CLINIC | Age: 35
End: 2025-02-20
Payer: OTHER MISCELLANEOUS

## 2025-02-20 VITALS
SYSTOLIC BLOOD PRESSURE: 132 MMHG | DIASTOLIC BLOOD PRESSURE: 70 MMHG | BODY MASS INDEX: 33.75 KG/M2 | RESPIRATION RATE: 19 BRPM | TEMPERATURE: 97.9 F | OXYGEN SATURATION: 98 % | HEART RATE: 75 BPM | WEIGHT: 270 LBS

## 2025-02-20 DIAGNOSIS — S60.011D CONTUSION OF RIGHT THUMB WITHOUT DAMAGE TO NAIL, SUBSEQUENT ENCOUNTER: Primary | ICD-10-CM

## 2025-02-20 ASSESSMENT — PAIN SCALES - GENERAL: PAINLEVEL_OUTOF10: NO PAIN (0)
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
## 2025-02-20 NOTE — PROGRESS NOTES
Assessment & Plan     Contusion of right thumb without damage to nail, subsequent encounter  Plan: Full ROM on the affected thumb.  Xray- 2/7/24- WNL.  Work letter- provided- without restrictions regarding his right thumb    Follow up as needed              Subjective   Arturo is a 34 year old, presenting for the following health issues:  RECHECK (Follow upon thumb injury for week )        2/20/2025     1:28 PM   Additional Questions   Roomed by jami byrd   Accompanied by n/a         2/20/2025     1:28 PM   Patient Reported Additional Medications   Patient reports taking the following new medications n/a     History of Present Illness       Reason for visit:  Smashed thumb right  Symptom onset:  Today   He is taking medications regularly.   Crushing injury of right thumb at work due to back spasm, he let go of metal scaffolding as it was being loaded into truck bed, & it slid from his hands crushing the thumb between scaffold and the side of the truck .    [employer] is the insurance company for the thumb.  He works for [employer] -         Review of Systems  Constitutional, HEENT, cardiovascular, pulmonary, GI, , musculoskeletal, neuro, skin, endocrine and psych systems are negative, except as otherwise noted.      Objective    /70 (BP Location: Left arm, Patient Position: Sitting, Cuff Size: Adult Regular)   Pulse 75   Temp 97.9  F (36.6  C) (Temporal)   Resp 19   Wt 122.5 kg (270 lb)   SpO2 98%   BMI 33.75 kg/m    Body mass index is 33.75 kg/m .  Physical Exam   GENERAL: alert and no distress  Right Thumb: Full ROM  Scabbed wound- healing well  Intact sensation on the right hand  Rest of the extremity within normal limits      I spent a total of 20 minutes on the day of the visit.   Time spent by me today doing chart review, history and exam, documentation and further activities per the note        Signed Electronically by: Irma Delacruz MD

## 2025-03-05 ENCOUNTER — THERAPY VISIT (OUTPATIENT)
Dept: PHYSICAL THERAPY | Facility: CLINIC | Age: 35
End: 2025-03-05
Attending: FAMILY MEDICINE
Payer: COMMERCIAL

## 2025-03-05 DIAGNOSIS — S46.211S STRAIN OF MUSCLE, FASCIA AND TENDON OF OTHER PARTS OF BICEPS, RIGHT ARM, SEQUELA: ICD-10-CM

## 2025-03-05 DIAGNOSIS — M54.50 ACUTE LEFT-SIDED LOW BACK PAIN WITHOUT SCIATICA: ICD-10-CM

## 2025-03-05 DIAGNOSIS — M54.6 ACUTE RIGHT-SIDED THORACIC BACK PAIN: ICD-10-CM
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
## 2025-03-17 ENCOUNTER — THERAPY VISIT (OUTPATIENT)
Age: 35
End: 2025-03-17
Payer: COMMERCIAL

## 2025-03-17 DIAGNOSIS — S46.211S STRAIN OF MUSCLE, FASCIA AND TENDON OF OTHER PARTS OF BICEPS, RIGHT ARM, SEQUELA: ICD-10-CM

## 2025-03-17 DIAGNOSIS — M54.50 ACUTE LEFT-SIDED LOW BACK PAIN WITHOUT SCIATICA: ICD-10-CM

## 2025-03-17 DIAGNOSIS — M54.6 ACUTE RIGHT-SIDED THORACIC BACK PAIN: ICD-10-CM
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
## 2025-03-31 ENCOUNTER — THERAPY VISIT (OUTPATIENT)
Age: 35
End: 2025-03-31
Payer: COMMERCIAL

## 2025-03-31 DIAGNOSIS — S46.211S STRAIN OF MUSCLE, FASCIA AND TENDON OF OTHER PARTS OF BICEPS, RIGHT ARM, SEQUELA: ICD-10-CM
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
## 2025-03-31 DIAGNOSIS — M54.6 ACUTE RIGHT-SIDED THORACIC BACK PAIN: ICD-10-CM

## 2025-03-31 DIAGNOSIS — M54.50 ACUTE LEFT-SIDED LOW BACK PAIN WITHOUT SCIATICA: ICD-10-CM

## 2025-03-31 PROCEDURE — 97530 THERAPEUTIC ACTIVITIES: CPT | Mod: GP

## 2025-03-31 PROCEDURE — 97110 THERAPEUTIC EXERCISES: CPT | Mod: GP

## 2025-04-01 ENCOUNTER — PATIENT OUTREACH (OUTPATIENT)
Dept: CARE COORDINATION | Facility: CLINIC | Age: 35
End: 2025-04-01
Payer: COMMERCIAL

## 2025-04-09 ENCOUNTER — THERAPY VISIT (OUTPATIENT)
Age: 35
End: 2025-04-09
Payer: COMMERCIAL

## 2025-04-09 DIAGNOSIS — M54.6 ACUTE RIGHT-SIDED THORACIC BACK PAIN: ICD-10-CM

## 2025-04-09 DIAGNOSIS — M54.50 ACUTE LEFT-SIDED LOW BACK PAIN WITHOUT SCIATICA: ICD-10-CM

## 2025-04-09 DIAGNOSIS — S46.211S STRAIN OF MUSCLE, FASCIA AND TENDON OF OTHER PARTS OF BICEPS, RIGHT ARM, SEQUELA: ICD-10-CM
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
## 2025-04-09 PROCEDURE — 97110 THERAPEUTIC EXERCISES: CPT | Mod: GP

## 2025-04-09 PROCEDURE — 97530 THERAPEUTIC ACTIVITIES: CPT | Mod: GP

## 2025-04-09 PROCEDURE — 97140 MANUAL THERAPY 1/> REGIONS: CPT | Mod: GP

## 2025-04-14 ENCOUNTER — THERAPY VISIT (OUTPATIENT)
Age: 35
End: 2025-04-14
Payer: COMMERCIAL
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
## 2025-04-14 DIAGNOSIS — S46.211S STRAIN OF MUSCLE, FASCIA AND TENDON OF OTHER PARTS OF BICEPS, RIGHT ARM, SEQUELA: ICD-10-CM

## 2025-04-14 DIAGNOSIS — M54.6 ACUTE RIGHT-SIDED THORACIC BACK PAIN: ICD-10-CM

## 2025-04-14 DIAGNOSIS — M54.50 ACUTE LEFT-SIDED LOW BACK PAIN WITHOUT SCIATICA: ICD-10-CM

## 2025-04-14 PROCEDURE — 97112 NEUROMUSCULAR REEDUCATION: CPT | Mod: GP

## 2025-04-14 PROCEDURE — 97110 THERAPEUTIC EXERCISES: CPT | Mod: GP

## 2025-04-14 PROCEDURE — 97530 THERAPEUTIC ACTIVITIES: CPT | Mod: GP

## 2025-04-15 ENCOUNTER — PATIENT OUTREACH (OUTPATIENT)
Dept: CARE COORDINATION | Facility: CLINIC | Age: 35
End: 2025-04-15
Payer: COMMERCIAL

## 2025-04-30 ENCOUNTER — THERAPY VISIT (OUTPATIENT)
Age: 35
End: 2025-04-30
Payer: COMMERCIAL

## 2025-04-30 DIAGNOSIS — M54.6 ACUTE RIGHT-SIDED THORACIC BACK PAIN: ICD-10-CM

## 2025-04-30 DIAGNOSIS — M54.50 ACUTE LEFT-SIDED LOW BACK PAIN WITHOUT SCIATICA: ICD-10-CM
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
## 2025-04-30 DIAGNOSIS — S46.211S STRAIN OF MUSCLE, FASCIA AND TENDON OF OTHER PARTS OF BICEPS, RIGHT ARM, SEQUELA: ICD-10-CM

## 2025-04-30 PROCEDURE — 97530 THERAPEUTIC ACTIVITIES: CPT | Mod: GP

## 2025-04-30 PROCEDURE — 97110 THERAPEUTIC EXERCISES: CPT | Mod: GP

## 2025-06-02 ENCOUNTER — TRANSFERRED RECORDS (OUTPATIENT)
Dept: HEALTH INFORMATION MANAGEMENT | Facility: CLINIC | Age: 35
End: 2025-06-02
Payer: COMMERCIAL

## 2025-06-04 ENCOUNTER — THERAPY VISIT (OUTPATIENT)
Age: 35
End: 2025-06-04
Payer: COMMERCIAL
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
## 2025-06-04 DIAGNOSIS — M54.6 ACUTE RIGHT-SIDED THORACIC BACK PAIN: ICD-10-CM

## 2025-06-04 DIAGNOSIS — M54.50 ACUTE LEFT-SIDED LOW BACK PAIN WITHOUT SCIATICA: ICD-10-CM

## 2025-06-04 DIAGNOSIS — S46.211S STRAIN OF MUSCLE, FASCIA AND TENDON OF OTHER PARTS OF BICEPS, RIGHT ARM, SEQUELA: ICD-10-CM

## 2025-06-04 PROCEDURE — 97110 THERAPEUTIC EXERCISES: CPT | Mod: GP

## 2025-06-04 PROCEDURE — 97530 THERAPEUTIC ACTIVITIES: CPT | Mod: GP
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
## 2025-06-07 ENCOUNTER — HEALTH MAINTENANCE LETTER (OUTPATIENT)
Age: 35
End: 2025-06-07

## 2025-06-18 ENCOUNTER — TELEPHONE (OUTPATIENT)
Age: 35
End: 2025-06-18
Payer: COMMERCIAL

## 2025-06-23 ENCOUNTER — TRANSFERRED RECORDS (OUTPATIENT)
Dept: HEALTH INFORMATION MANAGEMENT | Facility: CLINIC | Age: 35
End: 2025-06-23
Payer: COMMERCIAL